# Patient Record
Sex: FEMALE | Race: WHITE | NOT HISPANIC OR LATINO | Employment: OTHER | URBAN - METROPOLITAN AREA
[De-identification: names, ages, dates, MRNs, and addresses within clinical notes are randomized per-mention and may not be internally consistent; named-entity substitution may affect disease eponyms.]

---

## 2019-01-13 ENCOUNTER — APPOINTMENT (OUTPATIENT)
Dept: RADIOLOGY | Facility: MEDICAL CENTER | Age: 64
DRG: 417 | End: 2019-01-13
Attending: HOSPITALIST
Payer: COMMERCIAL

## 2019-01-13 ENCOUNTER — APPOINTMENT (OUTPATIENT)
Dept: RADIOLOGY | Facility: MEDICAL CENTER | Age: 64
DRG: 417 | End: 2019-01-13
Attending: EMERGENCY MEDICINE
Payer: COMMERCIAL

## 2019-01-13 ENCOUNTER — HOSPITAL ENCOUNTER (INPATIENT)
Facility: MEDICAL CENTER | Age: 64
LOS: 4 days | DRG: 417 | End: 2019-01-20
Attending: EMERGENCY MEDICINE | Admitting: HOSPITALIST
Payer: COMMERCIAL

## 2019-01-13 PROBLEM — R07.9 CHEST PAIN: Status: ACTIVE | Noted: 2019-01-13

## 2019-01-13 PROBLEM — F11.20 CHRONIC NARCOTIC DEPENDENCE (HCC): Status: ACTIVE | Noted: 2019-01-13

## 2019-01-13 PROBLEM — K83.8 DILATION OF COMMON BILE DUCT: Status: ACTIVE | Noted: 2019-01-13

## 2019-01-13 LAB
ALBUMIN SERPL BCP-MCNC: 4.2 G/DL (ref 3.2–4.9)
ALBUMIN/GLOB SERPL: 1.4 G/DL
ALP SERPL-CCNC: 73 U/L (ref 30–99)
ALT SERPL-CCNC: 23 U/L (ref 2–50)
ANION GAP SERPL CALC-SCNC: 11 MMOL/L (ref 0–11.9)
APTT PPP: 22.3 SEC (ref 24.7–36)
AST SERPL-CCNC: 23 U/L (ref 12–45)
BASOPHILS # BLD AUTO: 0.5 % (ref 0–1.8)
BASOPHILS # BLD: 0.03 K/UL (ref 0–0.12)
BILIRUB SERPL-MCNC: 0.9 MG/DL (ref 0.1–1.5)
BNP SERPL-MCNC: 15 PG/ML (ref 0–100)
BUN SERPL-MCNC: 20 MG/DL (ref 8–22)
CALCIUM SERPL-MCNC: 9.1 MG/DL (ref 8.4–10.2)
CHLORIDE SERPL-SCNC: 105 MMOL/L (ref 96–112)
CO2 SERPL-SCNC: 20 MMOL/L (ref 20–33)
CREAT SERPL-MCNC: 0.7 MG/DL (ref 0.5–1.4)
EKG IMPRESSION: NORMAL
EOSINOPHIL # BLD AUTO: 0.03 K/UL (ref 0–0.51)
EOSINOPHIL NFR BLD: 0.5 % (ref 0–6.9)
ERYTHROCYTE [DISTWIDTH] IN BLOOD BY AUTOMATED COUNT: 39.7 FL (ref 35.9–50)
GLOBULIN SER CALC-MCNC: 2.9 G/DL (ref 1.9–3.5)
GLUCOSE SERPL-MCNC: 143 MG/DL (ref 65–99)
HCT VFR BLD AUTO: 40.3 % (ref 37–47)
HGB BLD-MCNC: 14.1 G/DL (ref 12–16)
IMM GRANULOCYTES # BLD AUTO: 0.03 K/UL (ref 0–0.11)
IMM GRANULOCYTES NFR BLD AUTO: 0.5 % (ref 0–0.9)
INR PPP: 0.95 (ref 0.87–1.13)
LIPASE SERPL-CCNC: 30 U/L (ref 7–58)
LYMPHOCYTES # BLD AUTO: 1.01 K/UL (ref 1–4.8)
LYMPHOCYTES NFR BLD: 16.1 % (ref 22–41)
MCH RBC QN AUTO: 30.7 PG (ref 27–33)
MCHC RBC AUTO-ENTMCNC: 35 G/DL (ref 33.6–35)
MCV RBC AUTO: 87.8 FL (ref 81.4–97.8)
MONOCYTES # BLD AUTO: 0.38 K/UL (ref 0–0.85)
MONOCYTES NFR BLD AUTO: 6.1 % (ref 0–13.4)
NEUTROPHILS # BLD AUTO: 4.79 K/UL (ref 2–7.15)
NEUTROPHILS NFR BLD: 76.3 % (ref 44–72)
NRBC # BLD AUTO: 0 K/UL
NRBC BLD-RTO: 0 /100 WBC
PLATELET # BLD AUTO: 227 K/UL (ref 164–446)
PMV BLD AUTO: 9.7 FL (ref 9–12.9)
POTASSIUM SERPL-SCNC: 4 MMOL/L (ref 3.6–5.5)
PROT SERPL-MCNC: 7.1 G/DL (ref 6–8.2)
PROTHROMBIN TIME: 12.6 SEC (ref 12–14.6)
RBC # BLD AUTO: 4.59 M/UL (ref 4.2–5.4)
SODIUM SERPL-SCNC: 136 MMOL/L (ref 135–145)
TROPONIN I SERPL-MCNC: <0.02 NG/ML (ref 0–0.04)
WBC # BLD AUTO: 6.3 K/UL (ref 4.8–10.8)

## 2019-01-13 PROCEDURE — 85025 COMPLETE CBC W/AUTO DIFF WBC: CPT

## 2019-01-13 PROCEDURE — 96376 TX/PRO/DX INJ SAME DRUG ADON: CPT

## 2019-01-13 PROCEDURE — 700117 HCHG RX CONTRAST REV CODE 255: Performed by: EMERGENCY MEDICINE

## 2019-01-13 PROCEDURE — 700111 HCHG RX REV CODE 636 W/ 250 OVERRIDE (IP): Performed by: EMERGENCY MEDICINE

## 2019-01-13 PROCEDURE — 80053 COMPREHEN METABOLIC PANEL: CPT

## 2019-01-13 PROCEDURE — 85730 THROMBOPLASTIN TIME PARTIAL: CPT

## 2019-01-13 PROCEDURE — A9270 NON-COVERED ITEM OR SERVICE: HCPCS | Performed by: HOSPITALIST

## 2019-01-13 PROCEDURE — 96375 TX/PRO/DX INJ NEW DRUG ADDON: CPT

## 2019-01-13 PROCEDURE — 99285 EMERGENCY DEPT VISIT HI MDM: CPT

## 2019-01-13 PROCEDURE — 83690 ASSAY OF LIPASE: CPT

## 2019-01-13 PROCEDURE — 93005 ELECTROCARDIOGRAM TRACING: CPT

## 2019-01-13 PROCEDURE — 700102 HCHG RX REV CODE 250 W/ 637 OVERRIDE(OP): Performed by: EMERGENCY MEDICINE

## 2019-01-13 PROCEDURE — 83880 ASSAY OF NATRIURETIC PEPTIDE: CPT

## 2019-01-13 PROCEDURE — 71275 CT ANGIOGRAPHY CHEST: CPT

## 2019-01-13 PROCEDURE — G0378 HOSPITAL OBSERVATION PER HR: HCPCS

## 2019-01-13 PROCEDURE — 700105 HCHG RX REV CODE 258: Performed by: HOSPITALIST

## 2019-01-13 PROCEDURE — 700111 HCHG RX REV CODE 636 W/ 250 OVERRIDE (IP)

## 2019-01-13 PROCEDURE — 700101 HCHG RX REV CODE 250

## 2019-01-13 PROCEDURE — 71045 X-RAY EXAM CHEST 1 VIEW: CPT

## 2019-01-13 PROCEDURE — 700102 HCHG RX REV CODE 250 W/ 637 OVERRIDE(OP): Performed by: HOSPITALIST

## 2019-01-13 PROCEDURE — 96374 THER/PROPH/DIAG INJ IV PUSH: CPT

## 2019-01-13 PROCEDURE — 85610 PROTHROMBIN TIME: CPT

## 2019-01-13 PROCEDURE — A9270 NON-COVERED ITEM OR SERVICE: HCPCS | Performed by: EMERGENCY MEDICINE

## 2019-01-13 PROCEDURE — 74181 MRI ABDOMEN W/O CONTRAST: CPT

## 2019-01-13 PROCEDURE — 84484 ASSAY OF TROPONIN QUANT: CPT

## 2019-01-13 PROCEDURE — 76705 ECHO EXAM OF ABDOMEN: CPT

## 2019-01-13 PROCEDURE — 700111 HCHG RX REV CODE 636 W/ 250 OVERRIDE (IP): Performed by: HOSPITALIST

## 2019-01-13 PROCEDURE — C9113 INJ PANTOPRAZOLE SODIUM, VIA: HCPCS | Performed by: HOSPITALIST

## 2019-01-13 PROCEDURE — 36415 COLL VENOUS BLD VENIPUNCTURE: CPT

## 2019-01-13 PROCEDURE — 99219 PR INITIAL OBSERVATION CARE,LEVL II: CPT | Performed by: HOSPITALIST

## 2019-01-13 RX ORDER — OXYCODONE AND ACETAMINOPHEN 10; 325 MG/1; MG/1
.5-1 TABLET ORAL EVERY 6 HOURS PRN
COMMUNITY

## 2019-01-13 RX ORDER — BUSPIRONE HYDROCHLORIDE 7.5 MG/1
7.5 TABLET ORAL 2 TIMES DAILY
COMMUNITY

## 2019-01-13 RX ORDER — ONDANSETRON 2 MG/ML
4 INJECTION INTRAMUSCULAR; INTRAVENOUS EVERY 4 HOURS PRN
Status: DISCONTINUED | OUTPATIENT
Start: 2019-01-13 | End: 2019-01-15

## 2019-01-13 RX ORDER — CYCLOBENZAPRINE HCL 10 MG
10 TABLET ORAL 3 TIMES DAILY PRN
Status: DISCONTINUED | OUTPATIENT
Start: 2019-01-13 | End: 2019-01-20 | Stop reason: HOSPADM

## 2019-01-13 RX ORDER — HYDROMORPHONE HYDROCHLORIDE 1 MG/ML
0.25 INJECTION, SOLUTION INTRAMUSCULAR; INTRAVENOUS; SUBCUTANEOUS
Status: DISCONTINUED | OUTPATIENT
Start: 2019-01-13 | End: 2019-01-13

## 2019-01-13 RX ORDER — OXYCODONE HYDROCHLORIDE 5 MG/1
5 TABLET ORAL
Status: DISCONTINUED | OUTPATIENT
Start: 2019-01-13 | End: 2019-01-14

## 2019-01-13 RX ORDER — MORPHINE SULFATE 4 MG/ML
4 INJECTION, SOLUTION INTRAMUSCULAR; INTRAVENOUS
Status: COMPLETED | OUTPATIENT
Start: 2019-01-13 | End: 2019-01-13

## 2019-01-13 RX ORDER — POLYETHYLENE GLYCOL 3350 17 G/17G
1 POWDER, FOR SOLUTION ORAL
Status: DISCONTINUED | OUTPATIENT
Start: 2019-01-13 | End: 2019-01-20 | Stop reason: HOSPADM

## 2019-01-13 RX ORDER — DULOXETIN HYDROCHLORIDE 30 MG/1
60 CAPSULE, DELAYED RELEASE ORAL 2 TIMES DAILY
Status: DISCONTINUED | OUTPATIENT
Start: 2019-01-13 | End: 2019-01-20 | Stop reason: HOSPADM

## 2019-01-13 RX ORDER — ASPIRIN 325 MG
325 TABLET ORAL DAILY
Status: DISCONTINUED | OUTPATIENT
Start: 2019-01-13 | End: 2019-01-15

## 2019-01-13 RX ORDER — PANTOPRAZOLE SODIUM 40 MG/10ML
40 INJECTION, POWDER, LYOPHILIZED, FOR SOLUTION INTRAVENOUS ONCE
Status: COMPLETED | OUTPATIENT
Start: 2019-01-13 | End: 2019-01-13

## 2019-01-13 RX ORDER — AMOXICILLIN 250 MG
2 CAPSULE ORAL 2 TIMES DAILY
Status: DISCONTINUED | OUTPATIENT
Start: 2019-01-13 | End: 2019-01-20 | Stop reason: HOSPADM

## 2019-01-13 RX ORDER — SODIUM CHLORIDE 9 MG/ML
INJECTION, SOLUTION INTRAVENOUS CONTINUOUS
Status: DISCONTINUED | OUTPATIENT
Start: 2019-01-13 | End: 2019-01-15

## 2019-01-13 RX ORDER — LORAZEPAM 2 MG/ML
0.5 INJECTION INTRAMUSCULAR EVERY 4 HOURS PRN
Status: DISCONTINUED | OUTPATIENT
Start: 2019-01-13 | End: 2019-01-15

## 2019-01-13 RX ORDER — ONDANSETRON 2 MG/ML
4 INJECTION INTRAMUSCULAR; INTRAVENOUS ONCE
Status: COMPLETED | OUTPATIENT
Start: 2019-01-13 | End: 2019-01-13

## 2019-01-13 RX ORDER — IBUPROFEN 200 MG
400 TABLET ORAL EVERY 6 HOURS PRN
COMMUNITY

## 2019-01-13 RX ORDER — ASPIRIN 81 MG/1
324 TABLET, CHEWABLE ORAL DAILY
Status: DISCONTINUED | OUTPATIENT
Start: 2019-01-13 | End: 2019-01-15

## 2019-01-13 RX ORDER — OXYCODONE HYDROCHLORIDE 5 MG/1
5 TABLET ORAL
Status: DISCONTINUED | OUTPATIENT
Start: 2019-01-13 | End: 2019-01-13

## 2019-01-13 RX ORDER — OXYCODONE AND ACETAMINOPHEN 10; 325 MG/1; MG/1
.5-1 TABLET ORAL EVERY 6 HOURS PRN
Status: DISCONTINUED | OUTPATIENT
Start: 2019-01-13 | End: 2019-01-14

## 2019-01-13 RX ORDER — HYDROMORPHONE HYDROCHLORIDE 1 MG/ML
1 INJECTION, SOLUTION INTRAMUSCULAR; INTRAVENOUS; SUBCUTANEOUS
Status: DISCONTINUED | OUTPATIENT
Start: 2019-01-13 | End: 2019-01-15

## 2019-01-13 RX ORDER — PROMETHAZINE HYDROCHLORIDE 25 MG/1
12.5-25 TABLET ORAL EVERY 4 HOURS PRN
Status: DISCONTINUED | OUTPATIENT
Start: 2019-01-13 | End: 2019-01-20 | Stop reason: HOSPADM

## 2019-01-13 RX ORDER — ONDANSETRON 4 MG/1
4 TABLET, ORALLY DISINTEGRATING ORAL EVERY 4 HOURS PRN
Status: DISCONTINUED | OUTPATIENT
Start: 2019-01-13 | End: 2019-01-20 | Stop reason: HOSPADM

## 2019-01-13 RX ORDER — HYDROMORPHONE HYDROCHLORIDE 1 MG/ML
1 INJECTION, SOLUTION INTRAMUSCULAR; INTRAVENOUS; SUBCUTANEOUS ONCE
Status: COMPLETED | OUTPATIENT
Start: 2019-01-13 | End: 2019-01-13

## 2019-01-13 RX ORDER — LORAZEPAM 2 MG/ML
INJECTION INTRAMUSCULAR
Status: COMPLETED
Start: 2019-01-13 | End: 2019-01-13

## 2019-01-13 RX ORDER — CYCLOBENZAPRINE HCL 10 MG
10 TABLET ORAL 3 TIMES DAILY PRN
COMMUNITY

## 2019-01-13 RX ORDER — BUSPIRONE HYDROCHLORIDE 5 MG/1
7.5 TABLET ORAL 2 TIMES DAILY
Status: DISCONTINUED | OUTPATIENT
Start: 2019-01-13 | End: 2019-01-20 | Stop reason: HOSPADM

## 2019-01-13 RX ORDER — BISACODYL 10 MG
10 SUPPOSITORY, RECTAL RECTAL
Status: DISCONTINUED | OUTPATIENT
Start: 2019-01-13 | End: 2019-01-20 | Stop reason: HOSPADM

## 2019-01-13 RX ORDER — PROMETHAZINE HYDROCHLORIDE 25 MG/1
12.5-25 SUPPOSITORY RECTAL EVERY 4 HOURS PRN
Status: DISCONTINUED | OUTPATIENT
Start: 2019-01-13 | End: 2019-01-20 | Stop reason: HOSPADM

## 2019-01-13 RX ORDER — ASPIRIN 600 MG/1
300 SUPPOSITORY RECTAL DAILY
Status: DISCONTINUED | OUTPATIENT
Start: 2019-01-13 | End: 2019-01-15

## 2019-01-13 RX ORDER — DULOXETIN HYDROCHLORIDE 60 MG/1
60 CAPSULE, DELAYED RELEASE ORAL 2 TIMES DAILY
COMMUNITY

## 2019-01-13 RX ORDER — OXYCODONE HYDROCHLORIDE 5 MG/1
2.5 TABLET ORAL
Status: DISCONTINUED | OUTPATIENT
Start: 2019-01-13 | End: 2019-01-14

## 2019-01-13 RX ORDER — OXYCODONE HYDROCHLORIDE 5 MG/1
2.5 TABLET ORAL
Status: DISCONTINUED | OUTPATIENT
Start: 2019-01-13 | End: 2019-01-13

## 2019-01-13 RX ADMIN — IOHEXOL 100 ML: 350 INJECTION, SOLUTION INTRAVENOUS at 10:43

## 2019-01-13 RX ADMIN — HYDROMORPHONE HYDROCHLORIDE 1 MG: 1 INJECTION, SOLUTION INTRAMUSCULAR; INTRAVENOUS; SUBCUTANEOUS at 20:56

## 2019-01-13 RX ADMIN — SODIUM CHLORIDE: 9 INJECTION, SOLUTION INTRAVENOUS at 12:15

## 2019-01-13 RX ADMIN — ONDANSETRON 4 MG: 2 INJECTION INTRAMUSCULAR; INTRAVENOUS at 10:02

## 2019-01-13 RX ADMIN — MORPHINE SULFATE 4 MG: 4 INJECTION INTRAVENOUS at 14:36

## 2019-01-13 RX ADMIN — MORPHINE SULFATE 4 MG: 4 INJECTION INTRAVENOUS at 16:19

## 2019-01-13 RX ADMIN — HYDROMORPHONE HYDROCHLORIDE 1 MG: 1 INJECTION, SOLUTION INTRAMUSCULAR; INTRAVENOUS; SUBCUTANEOUS at 17:50

## 2019-01-13 RX ADMIN — LIDOCAINE HYDROCHLORIDE 30 ML: 20 SOLUTION OROPHARYNGEAL at 10:02

## 2019-01-13 RX ADMIN — CYCLOBENZAPRINE HYDROCHLORIDE 10 MG: 10 TABLET, FILM COATED ORAL at 20:09

## 2019-01-13 RX ADMIN — LORAZEPAM 0.5 MG: 2 INJECTION INTRAMUSCULAR; INTRAVENOUS at 17:53

## 2019-01-13 RX ADMIN — PANTOPRAZOLE SODIUM 40 MG: 40 INJECTION, POWDER, FOR SOLUTION INTRAVENOUS at 20:10

## 2019-01-13 RX ADMIN — HYDROMORPHONE HYDROCHLORIDE 1 MG: 1 INJECTION, SOLUTION INTRAMUSCULAR; INTRAVENOUS; SUBCUTANEOUS at 11:35

## 2019-01-13 RX ADMIN — ONDANSETRON 4 MG: 2 INJECTION INTRAMUSCULAR; INTRAVENOUS at 20:10

## 2019-01-13 RX ADMIN — HYDROMORPHONE HYDROCHLORIDE 1 MG: 1 INJECTION, SOLUTION INTRAMUSCULAR; INTRAVENOUS; SUBCUTANEOUS at 13:32

## 2019-01-13 RX ADMIN — HYDROMORPHONE HYDROCHLORIDE 0.25 MG: 1 INJECTION, SOLUTION INTRAMUSCULAR; INTRAVENOUS; SUBCUTANEOUS at 17:16

## 2019-01-13 RX ADMIN — ASPIRIN 325 MG ORAL TABLET 325 MG: 325 PILL ORAL at 14:29

## 2019-01-13 RX ADMIN — MORPHINE SULFATE 4 MG: 4 INJECTION INTRAVENOUS at 10:16

## 2019-01-13 RX ADMIN — OXYCODONE HYDROCHLORIDE AND ACETAMINOPHEN 1 TABLET: 10; 325 TABLET ORAL at 20:10

## 2019-01-13 ASSESSMENT — ENCOUNTER SYMPTOMS
LOSS OF CONSCIOUSNESS: 0
MYALGIAS: 0
FLANK PAIN: 0
NERVOUS/ANXIOUS: 1
VOMITING: 1
NAUSEA: 1
DEPRESSION: 0
BACK PAIN: 1
COUGH: 0
CONSTITUTIONAL NEGATIVE: 1
CHILLS: 0
ABDOMINAL PAIN: 1
NEUROLOGICAL NEGATIVE: 1
WEIGHT LOSS: 0
DIZZINESS: 0
WEAKNESS: 0
BRUISES/BLEEDS EASILY: 0
SHORTNESS OF BREATH: 0
FEVER: 0
RESPIRATORY NEGATIVE: 1

## 2019-01-13 ASSESSMENT — COGNITIVE AND FUNCTIONAL STATUS - GENERAL
SUGGESTED CMS G CODE MODIFIER DAILY ACTIVITY: CH
SUGGESTED CMS G CODE MODIFIER MOBILITY: CH
DAILY ACTIVITIY SCORE: 24
MOBILITY SCORE: 24

## 2019-01-13 ASSESSMENT — PAIN SCALES - GENERAL
PAINLEVEL_OUTOF10: 9
PAINLEVEL_OUTOF10: 8
PAINLEVEL_OUTOF10: 7

## 2019-01-13 ASSESSMENT — PAIN DESCRIPTION - DESCRIPTORS: DESCRIPTORS: SHARP

## 2019-01-13 NOTE — ED TRIAGE NOTES
Chief Complaint   Patient presents with   • Chest Pain     pain started at 0400, radiating to back. Patient report 9/10 pain, denies cardiac history, EKG completed.    • Back Pain

## 2019-01-13 NOTE — ED NOTES
1145 Admitting orders received. Waiting for bed assignment.  1205 room assignment received . Pt to go to [facility]2. Room dirty at this time

## 2019-01-13 NOTE — ED PROVIDER NOTES
"ED Provider Note    CHIEF COMPLAINT  Chief Complaint   Patient presents with   • Chest Pain     pain started at 0400, radiating to back. Patient report 9/10 pain, denies cardiac history, EKG completed.    • Back Pain       HPI  Alba Tsang is a 63 y.o. female who presents with chest pain.  Central sternal radiates to the back.  No modifying factors.  Not positional.  Not pleuritic.  Associated epigastric abdominal pain.  Sharp character.  Started at 4 AM and has been constant.  Tried Gas-X which made her nauseous and she vomited afterwards.  She had normal bowel movements.  She has not had fever.  No leg swelling, immobilization or surgery.     She had a similar episode to this a month ago.  She has a history of knee pain and hip pain for which she has taken long-term opiates.  She is currently trying atyp or her opiates.  Towards the end of the month when her supply of pain medication got low she had symptoms last month as well.  This lasted for about a day and then went away.    REVIEW OF SYSTEMS  As per HPI, otherwise a 10 point review of systems is negative    PAST MEDICAL HISTORY  Opiate dependence    SOCIAL HISTORY  Social History   Substance Use Topics   • Smoking status: Never Smoker   • Smokeless tobacco: Not on file   • Alcohol use Yes      Comment: \"wine with dinner\"       SURGICAL HISTORY  History reviewed. No pertinent surgical history.    CURRENT MEDICATIONS  Home Medications    **Home medications have not yet been reviewed for this encounter**         ALLERGIES  No Known Allergies    PHYSICAL EXAM  VITAL SIGNS: BP (!) 169/85   Pulse 73   Resp 18   Ht 1.676 m (5' 6\")   Wt 70 kg (154 lb 5.2 oz)   SpO2 97%   BMI 24.91 kg/m²    Constitutional: Awake and alert  HENT:  Atraumatic, Normocephalic.Oropharynx moist mucus membranes, Nose normal inspection.   Eyes: Normal inspection  Neck: Supple  Cardiovascular: Normal heart rate, Normal rhythm.  No murmur auscultated symmetric peripheral pulses. "   Thorax & Lungs: No respiratory distress, No wheezing, No rales, No rhonchi, No chest tenderness.   Abdomen: Tenderness to palpation in epigastrium and right upper quadrant.  No rebound or peritonitis.  Skin: Warm, Dry, No rash.   Extremities: No clubbing, cyanosis, edema, no Homans or cords   Neurologic: Grossly normal   Psychiatric: Anxious appearing    RADIOLOGY/PROCEDURES  CT-CTA COMPLETE THORACOABDOMINAL AORTA   Final Result      1.  No aortic aneurysm or dissection. No pulmonary embolus.   2.  Colonic diverticulosis.   3.  Mild circumferential mural thickening of the transverse and descending colon, likely related to underdistention or sequela of prior colitis. Acute colitis is less likely.         US-RUQ   Final Result         1. Cholelithiasis. No evidence of cholecystitis.   2. Dilated, 10 mm CBD. If there is clinical concern for choledocholithiasis, consider MRCP.   3. Increased hepatic echogenicity, suggestive of hepatic steatosis or hepatocellular disease.      DX-CHEST-PORTABLE (1 VIEW)   Final Result      No acute cardiopulmonary abnormality.           Imaging is interpreted by radiologist    Labs:  Results for orders placed or performed during the hospital encounter of 01/13/19   CBC with Differential   Result Value Ref Range    WBC 6.3 4.8 - 10.8 K/uL    RBC 4.59 4.20 - 5.40 M/uL    Hemoglobin 14.1 12.0 - 16.0 g/dL    Hematocrit 40.3 37.0 - 47.0 %    MCV 87.8 81.4 - 97.8 fL    MCH 30.7 27.0 - 33.0 pg    MCHC 35.0 33.6 - 35.0 g/dL    RDW 39.7 35.9 - 50.0 fL    Platelet Count 227 164 - 446 K/uL    MPV 9.7 9.0 - 12.9 fL    Neutrophils-Polys 76.30 (H) 44.00 - 72.00 %    Lymphocytes 16.10 (L) 22.00 - 41.00 %    Monocytes 6.10 0.00 - 13.40 %    Eosinophils 0.50 0.00 - 6.90 %    Basophils 0.50 0.00 - 1.80 %    Immature Granulocytes 0.50 0.00 - 0.90 %    Nucleated RBC 0.00 /100 WBC    Neutrophils (Absolute) 4.79 2.00 - 7.15 K/uL    Lymphs (Absolute) 1.01 1.00 - 4.80 K/uL    Monos (Absolute) 0.38 0.00 - 0.85  K/uL    Eos (Absolute) 0.03 0.00 - 0.51 K/uL    Baso (Absolute) 0.03 0.00 - 0.12 K/uL    Immature Granulocytes (abs) 0.03 0.00 - 0.11 K/uL    NRBC (Absolute) 0.00 K/uL   Complete Metabolic Panel (CMP)   Result Value Ref Range    Sodium 136 135 - 145 mmol/L    Potassium 4.0 3.6 - 5.5 mmol/L    Chloride 105 96 - 112 mmol/L    Co2 20 20 - 33 mmol/L    Anion Gap 11.0 0.0 - 11.9    Glucose 143 (H) 65 - 99 mg/dL    Bun 20 8 - 22 mg/dL    Creatinine 0.70 0.50 - 1.40 mg/dL    Calcium 9.1 8.4 - 10.2 mg/dL    AST(SGOT) 23 12 - 45 U/L    ALT(SGPT) 23 2 - 50 U/L    Alkaline Phosphatase 73 30 - 99 U/L    Total Bilirubin 0.9 0.1 - 1.5 mg/dL    Albumin 4.2 3.2 - 4.9 g/dL    Total Protein 7.1 6.0 - 8.2 g/dL    Globulin 2.9 1.9 - 3.5 g/dL    A-G Ratio 1.4 g/dL   Btype Natriuretic Peptide (BNP)   Result Value Ref Range    B Natriuretic Peptide 15 0 - 100 pg/mL   Prothrombin Time (PT/INR)   Result Value Ref Range    PT 12.6 12.0 - 14.6 sec    INR 0.95 0.87 - 1.13   APTT   Result Value Ref Range    APTT 22.3 (L) 24.7 - 36.0 sec   Lipase   Result Value Ref Range    Lipase 30 7 - 58 U/L   Troponin STAT   Result Value Ref Range    Troponin I <0.02 0.00 - 0.04 ng/mL   ESTIMATED GFR   Result Value Ref Range    GFR If African American >60 >60 mL/min/1.73 m 2    GFR If Non African American >60 >60 mL/min/1.73 m 2   EKG   Result Value Ref Range    Report       [facility]    Test Date:  2019  Pt Name:    GOMEZ LOPEZ             Department: [facility]  MRN:        [id number]                      Room:       -ROOM 8  Gender:     Female                       Technician: SRINIVAS  :        [id number]                   Requested By:ER TRIAGE PROTOCOL  Order #:    [id number]                    Reading MD: CORETTA WALLACE MD    Measurements  Intervals                                Axis  Rate:       70                           P:          56  NM:         151                          QRS:        61  QRSD:        95                           T:          71  QT:         417  QTc:        450    Interpretive Statements  Sinus rhythm  No previous ECG available for comparison    Electronically Signed On 1- 11:18:55 PST by CROETTA WALLACE MD         Medications   morphine (pf) 4 mg/ml injection 4 mg (4 mg Intravenous Given 1/13/19 1016)   HYDROmorphone pf (DILAUDID) injection 1 mg (not administered)   hyoscyamine-maalox plus-lidocaine viscous (GI COCKTAIL) oral susp 30 mL (30 mL Oral Given 1/13/19 1002)   ondansetron (ZOFRAN) syringe/vial injection 4 mg (4 mg Intravenous Given 1/13/19 1002)   iohexol (OMNIPAQUE) 350 mg/mL (100 mL Intravenous Given 1/13/19 1043)       COURSE & MEDICAL DECISION MAKING  Patient presents with chest pain radiating to her back.  Had associated abdominal tenderness on her examination.  Differential was broad.  Initial EKGs without acute ischemia.  Trial of GI cocktail did not improve symptoms.  He was given morphine with transient slight improvement of symptoms, but had recurrence of severe pain and was subsequently given Dilaudid IV.  Laboratory data returned reassuring.  CT scan of the aorta was unremarkable.  Ultrasound of the right upper quadrant does show small gallstones, but no evidence of cholecystitis or indication for immediate surgical consultation.  She was noted to have a dilated common bile duct despite unremarkable laboratory data.  Because of ongoing significant symptoms this will need further evaluation.  Also will obtain serial troponins.  I paged hospitalist for admission.      FINAL IMPRESSION  1.  Chest pain  2.  Right upper quadrant abdominal pain with dilated common bile duct, rule out biliary obstruction      This dictation was created using voice recognition software. The accuracy of the dictation is limited to the abilities of the software.  The nursing notes were reviewed and certain aspects of this information were incorporated into this note.      Electronically  signed by: Jose Díaz, 1/13/2019 10:09 AM

## 2019-01-13 NOTE — H&P
Hospital Medicine History & Physical Note    Date of Service  1/13/2019    Primary Care Physician  Micheline Walker D.O.    Consultants  None at this time.    Code Status  Full code.    Chief Complaint  Right upper quadrant and chest pain.    History of Presenting Illness  63 y.o. female who presented 1/13/2019 with epigastric, RUQ and lower chest pain radiating to between her shoulder blades. She had a similar episode to this one about a month ago that awakened her from sleep and persisted for about 24 hours. Then today a similar pain awakened her from sleep, this time accompanied by an episode of nausea and vomiting after she took some Gas X to try to relieve the discomfort. No diarrhea, fevers, chills or jaundice.    Recently she has had knee and hip surgery and is in the process of weaning off of narcotic pain medications.        Review of Systems  Review of Systems   Constitutional: Negative.  Negative for chills, fever, malaise/fatigue and weight loss.   HENT: Negative.    Respiratory: Negative.  Negative for cough and shortness of breath.    Cardiovascular: Positive for chest pain. Negative for leg swelling.   Gastrointestinal: Positive for abdominal pain, nausea and vomiting.   Genitourinary: Negative.  Negative for dysuria and flank pain.   Musculoskeletal: Positive for back pain and joint pain. Negative for myalgias.   Neurological: Negative.  Negative for dizziness, loss of consciousness and weakness.   Endo/Heme/Allergies: Negative.  Does not bruise/bleed easily.   Psychiatric/Behavioral: Negative for depression. The patient is nervous/anxious.    All other systems reviewed and are negative.      Past Medical History   has a past medical history of Chronic pain.    Surgical History   has a past surgical history that includes hip arthroplasty total (Right); acl repair (Right); and hysterectomy, total abdominal.     Family History  family history includes No Known Problems in her mother.     Social  History   reports that she has never smoked. She does not have any smokeless tobacco history on file. She reports that she drinks alcohol.she has a glass of wine with dinner several times per week. No history of substance abuse.    Allergies  No Known Allergies    Medications  Prior to Admission Medications   Prescriptions Last Dose Informant Patient Reported? Taking?   DULoxetine (CYMBALTA) 60 MG Cap DR Particles delayed-release capsule 1/12/2019 at 1930 Rx Bottle (For Med Information) Yes Yes   Sig: Take 60 mg by mouth 2 times a day.   busPIRone (BUSPAR) 7.5 MG tablet 1/12/2019 at 1930 Rx Bottle (For Med Information) Yes Yes   Sig: Take 7.5 mg by mouth 2 times a day.   cyclobenzaprine (FLEXERIL) 10 MG Tab 1/12/2019 at 1930 Rx Bottle (For Med Information) Yes Yes   Sig: Take 10 mg by mouth 3 times a day as needed for Muscle Spasms.   ibuprofen (MOTRIN) 200 MG Tab 1/12/2019 at 2000 Patient Yes Yes   Sig: Take 400 mg by mouth every 6 hours as needed for Mild Pain.   oxyCODONE-acetaminophen (PERCOCET-10)  MG Tab 1/13/2019 at 0630 Rx Bottle (For Med Information) Yes Yes   Sig: Take 0.5-1 Tabs by mouth every 6 hours as needed for Severe Pain.      Facility-Administered Medications: None       Physical Exam  Pulse:  [61-73] 62  Resp:  [18] 18  BP: (169)/(85) 169/85    Physical Exam   Constitutional: She appears well-developed and well-nourished. No distress.   Plan of care discussed with bedside RN.   HENT:   Nose: Nose normal.   Mouth/Throat: Oropharynx is clear and moist. No oropharyngeal exudate.   Eyes: Conjunctivae are normal. Right eye exhibits no discharge. Left eye exhibits no discharge. No scleral icterus.   Neck: No JVD present. No tracheal deviation present.   Cardiovascular: Normal rate, regular rhythm and normal heart sounds.    Pulmonary/Chest: Effort normal and breath sounds normal. No stridor. No respiratory distress. She has no wheezes. She has no rales. She exhibits no tenderness.   Abdominal:  Soft. Bowel sounds are normal. She exhibits no distension. There is tenderness in the right upper quadrant.   Musculoskeletal: She exhibits no edema or tenderness.   Neurological: She is alert. No cranial nerve deficit. She exhibits normal muscle tone.   Skin: Skin is warm and dry. She is not diaphoretic. No pallor.   Psychiatric: She has a normal mood and affect. Her behavior is normal.   Nursing note and vitals reviewed.      Laboratory:  Recent Labs      01/13/19 0923   WBC  6.3   RBC  4.59   HEMOGLOBIN  14.1   HEMATOCRIT  40.3   MCV  87.8   MCH  30.7   MCHC  35.0   RDW  39.7   PLATELETCT  227   MPV  9.7     Recent Labs      01/13/19 0923   SODIUM  136   POTASSIUM  4.0   CHLORIDE  105   CO2  20   GLUCOSE  143*   BUN  20   CREATININE  0.70   CALCIUM  9.1     Recent Labs      01/13/19 0923   ALTSGPT  23   ASTSGOT  23   ALKPHOSPHAT  73   TBILIRUBIN  0.9   LIPASE  30   GLUCOSE  143*     Recent Labs      01/13/19 0923   APTT  22.3*   INR  0.95     Recent Labs      01/13/19 0923   BNPBTYPENAT  15         Recent Labs      01/13/19 0923   TROPONINI  <0.02       Urinalysis:    No results found     Imaging:  CT-CTA COMPLETE THORACOABDOMINAL AORTA   Final Result      1.  No aortic aneurysm or dissection. No pulmonary embolus.   2.  Colonic diverticulosis.   3.  Mild circumferential mural thickening of the transverse and descending colon, likely related to underdistention or sequela of prior colitis. Acute colitis is less likely.         US-RUQ   Final Result         1. Cholelithiasis. No evidence of cholecystitis.   2. Dilated, 10 mm CBD. If there is clinical concern for choledocholithiasis, consider MRCP.   3. Increased hepatic echogenicity, suggestive of hepatic steatosis or hepatocellular disease.      DX-CHEST-PORTABLE (1 VIEW)   Final Result      No acute cardiopulmonary abnormality.      TW-[id number]-L/O    (Results Pending)         Assessment/Plan:  I anticipate this patient is appropriate for observation  status at this time.    Chronic narcotic dependence (HCC)   Assessment & Plan    Patient is on percocet at home chronically, continue this here q 4 hours prn in addition to oxycodone and dilaudid for escalating pain.  Continue home dosing of buspar and cymbalta for chronic non narcotic pain management.     Chest pain   Assessment & Plan    May be related to biliary condition, trend troponin levels, ekg prn.  Consider stress testing if there are changes in condition but at this time no stress test while working up bile duct issue. Chest pain is low suspicion for ACS at this time.     Dilation of common bile duct   Assessment & Plan    With RUQ pain but no elevation in liver studies.  Admit for observation, obtain MRCP to further characterize the biliary tree, no abx no cholecystitis   Pain management dilaudid 0.25mg iv every 3 hours as needed for more severe pain, oral oxycodone 5 mg po q 3 hours prn milder pain.  GI or surgery consult to consider if MRCP is abnormal.  Differential diagnosis includes neoplasm of the bile duct which may be visualized on mrcp.         VTE prophylaxis: SCD

## 2019-01-13 NOTE — ED NOTES
Med rec updated and complete  Allergies reviewed  Interviewed pt with  at bedside with permission from pt.  Pt has RX bottles at bedside, went over RX bottles and returned RX bottles back to pts .  Pt reports no antibiotics in the last 30 days.  Pt reports no vitamins.

## 2019-01-13 NOTE — PROGRESS NOTES
Patient admitted to telemetry floor.  MRI screen sheet sent down to MRI.  Patient given pain medication per MAR and ASA. Patient given ice chips, bed alarm placed, call light within reach.

## 2019-01-13 NOTE — ED NOTES
Pt medicated per ERP orders. Pt prepared for transport to acute floor. All belongings sent with pt.

## 2019-01-13 NOTE — ASSESSMENT & PLAN NOTE
Status post successful ERCP  Advance to low-fat diet as tolerated-discussed with patient, spouse  Discussed with GI  A.m. labs ordered by GI

## 2019-01-14 PROBLEM — K80.50 CHOLEDOCHOLITHIASIS: Status: ACTIVE | Noted: 2019-01-13

## 2019-01-14 PROBLEM — R74.8 ELEVATED LIVER ENZYMES: Status: ACTIVE | Noted: 2019-01-14

## 2019-01-14 LAB
ALBUMIN SERPL BCP-MCNC: 3.6 G/DL (ref 3.2–4.9)
ALBUMIN/GLOB SERPL: 1.4 G/DL
ALP SERPL-CCNC: 106 U/L (ref 30–99)
ALT SERPL-CCNC: 117 U/L (ref 2–50)
ANION GAP SERPL CALC-SCNC: 7 MMOL/L (ref 0–11.9)
AST SERPL-CCNC: 273 U/L (ref 12–45)
BASOPHILS # BLD AUTO: 0.2 % (ref 0–1.8)
BASOPHILS # BLD: 0.02 K/UL (ref 0–0.12)
BILIRUB SERPL-MCNC: 2.2 MG/DL (ref 0.1–1.5)
BUN SERPL-MCNC: 15 MG/DL (ref 8–22)
CALCIUM SERPL-MCNC: 8.3 MG/DL (ref 8.4–10.2)
CHLORIDE SERPL-SCNC: 104 MMOL/L (ref 96–112)
CO2 SERPL-SCNC: 28 MMOL/L (ref 20–33)
CREAT SERPL-MCNC: 0.71 MG/DL (ref 0.5–1.4)
EOSINOPHIL # BLD AUTO: 0.01 K/UL (ref 0–0.51)
EOSINOPHIL NFR BLD: 0.1 % (ref 0–6.9)
ERYTHROCYTE [DISTWIDTH] IN BLOOD BY AUTOMATED COUNT: 43.2 FL (ref 35.9–50)
GLOBULIN SER CALC-MCNC: 2.5 G/DL (ref 1.9–3.5)
GLUCOSE SERPL-MCNC: 122 MG/DL (ref 65–99)
HCT VFR BLD AUTO: 41.6 % (ref 37–47)
HGB BLD-MCNC: 13.7 G/DL (ref 12–16)
IMM GRANULOCYTES # BLD AUTO: 0.03 K/UL (ref 0–0.11)
IMM GRANULOCYTES NFR BLD AUTO: 0.3 % (ref 0–0.9)
LYMPHOCYTES # BLD AUTO: 0.58 K/UL (ref 1–4.8)
LYMPHOCYTES NFR BLD: 5.8 % (ref 22–41)
MCH RBC QN AUTO: 30.5 PG (ref 27–33)
MCHC RBC AUTO-ENTMCNC: 32.9 G/DL (ref 33.6–35)
MCV RBC AUTO: 92.7 FL (ref 81.4–97.8)
MONOCYTES # BLD AUTO: 0.69 K/UL (ref 0–0.85)
MONOCYTES NFR BLD AUTO: 6.9 % (ref 0–13.4)
NEUTROPHILS # BLD AUTO: 8.66 K/UL (ref 2–7.15)
NEUTROPHILS NFR BLD: 86.7 % (ref 44–72)
NRBC # BLD AUTO: 0 K/UL
NRBC BLD-RTO: 0 /100 WBC
PATHOLOGY CONSULT NOTE: NORMAL
PLATELET # BLD AUTO: 206 K/UL (ref 164–446)
PMV BLD AUTO: 10.3 FL (ref 9–12.9)
POTASSIUM SERPL-SCNC: 3.6 MMOL/L (ref 3.6–5.5)
PROT SERPL-MCNC: 6.1 G/DL (ref 6–8.2)
RBC # BLD AUTO: 4.49 M/UL (ref 4.2–5.4)
SODIUM SERPL-SCNC: 139 MMOL/L (ref 135–145)
WBC # BLD AUTO: 10 K/UL (ref 4.8–10.8)

## 2019-01-14 PROCEDURE — 700111 HCHG RX REV CODE 636 W/ 250 OVERRIDE (IP): Performed by: HOSPITALIST

## 2019-01-14 PROCEDURE — A9270 NON-COVERED ITEM OR SERVICE: HCPCS | Performed by: SURGERY

## 2019-01-14 PROCEDURE — A9270 NON-COVERED ITEM OR SERVICE: HCPCS | Performed by: HOSPITALIST

## 2019-01-14 PROCEDURE — 700102 HCHG RX REV CODE 250 W/ 637 OVERRIDE(OP): Performed by: INTERNAL MEDICINE

## 2019-01-14 PROCEDURE — 501570 HCHG TROCAR, SEPARATOR: Performed by: SURGERY

## 2019-01-14 PROCEDURE — 160002 HCHG RECOVERY MINUTES (STAT): Performed by: SURGERY

## 2019-01-14 PROCEDURE — 500800 HCHG LAPAROSCOPIC J/L HOOK: Performed by: SURGERY

## 2019-01-14 PROCEDURE — 501577 HCHG TROCAR, STEP 11MM: Performed by: SURGERY

## 2019-01-14 PROCEDURE — 80053 COMPREHEN METABOLIC PANEL: CPT

## 2019-01-14 PROCEDURE — 160028 HCHG SURGERY MINUTES - 1ST 30 MINS LEVEL 3: Performed by: SURGERY

## 2019-01-14 PROCEDURE — 0FT44ZZ RESECTION OF GALLBLADDER, PERCUTANEOUS ENDOSCOPIC APPROACH: ICD-10-PCS | Performed by: SURGERY

## 2019-01-14 PROCEDURE — 99226 PR SUBSEQUENT OBSERVATION CARE,LEVEL III: CPT | Performed by: INTERNAL MEDICINE

## 2019-01-14 PROCEDURE — 88304 TISSUE EXAM BY PATHOLOGIST: CPT

## 2019-01-14 PROCEDURE — 700102 HCHG RX REV CODE 250 W/ 637 OVERRIDE(OP): Performed by: ANESTHESIOLOGY

## 2019-01-14 PROCEDURE — 160009 HCHG ANES TIME/MIN: Performed by: SURGERY

## 2019-01-14 PROCEDURE — 160036 HCHG PACU - EA ADDL 30 MINS PHASE I: Performed by: SURGERY

## 2019-01-14 PROCEDURE — A9270 NON-COVERED ITEM OR SERVICE: HCPCS

## 2019-01-14 PROCEDURE — 700101 HCHG RX REV CODE 250

## 2019-01-14 PROCEDURE — G0378 HOSPITAL OBSERVATION PER HR: HCPCS

## 2019-01-14 PROCEDURE — 700102 HCHG RX REV CODE 250 W/ 637 OVERRIDE(OP)

## 2019-01-14 PROCEDURE — 700111 HCHG RX REV CODE 636 W/ 250 OVERRIDE (IP): Performed by: ANESTHESIOLOGY

## 2019-01-14 PROCEDURE — 96375 TX/PRO/DX INJ NEW DRUG ADDON: CPT

## 2019-01-14 PROCEDURE — A6402 STERILE GAUZE <= 16 SQ IN: HCPCS | Performed by: SURGERY

## 2019-01-14 PROCEDURE — 96376 TX/PRO/DX INJ SAME DRUG ADON: CPT

## 2019-01-14 PROCEDURE — 700111 HCHG RX REV CODE 636 W/ 250 OVERRIDE (IP)

## 2019-01-14 PROCEDURE — 160048 HCHG OR STATISTICAL LEVEL 1-5: Performed by: SURGERY

## 2019-01-14 PROCEDURE — A9270 NON-COVERED ITEM OR SERVICE: HCPCS | Performed by: ANESTHESIOLOGY

## 2019-01-14 PROCEDURE — 501838 HCHG SUTURE GENERAL: Performed by: SURGERY

## 2019-01-14 PROCEDURE — 501399 HCHG SPECIMAN BAG, ENDO CATC: Performed by: SURGERY

## 2019-01-14 PROCEDURE — 700102 HCHG RX REV CODE 250 W/ 637 OVERRIDE(OP): Performed by: HOSPITALIST

## 2019-01-14 PROCEDURE — A9270 NON-COVERED ITEM OR SERVICE: HCPCS | Performed by: INTERNAL MEDICINE

## 2019-01-14 PROCEDURE — 700102 HCHG RX REV CODE 250 W/ 637 OVERRIDE(OP): Performed by: SURGERY

## 2019-01-14 PROCEDURE — 501583 HCHG TROCAR, THRD CAN&SEAL 5X100: Performed by: SURGERY

## 2019-01-14 PROCEDURE — 160035 HCHG PACU - 1ST 60 MINS PHASE I: Performed by: SURGERY

## 2019-01-14 PROCEDURE — 502571 HCHG PACK, LAP CHOLE: Performed by: SURGERY

## 2019-01-14 PROCEDURE — 500868 HCHG NEEDLE, SURGI(VARES): Performed by: SURGERY

## 2019-01-14 PROCEDURE — 700105 HCHG RX REV CODE 258: Performed by: HOSPITALIST

## 2019-01-14 PROCEDURE — 160039 HCHG SURGERY MINUTES - EA ADDL 1 MIN LEVEL 3: Performed by: SURGERY

## 2019-01-14 PROCEDURE — 501584 HCHG TROCAR, THRD CAN&SEAL11X100: Performed by: SURGERY

## 2019-01-14 PROCEDURE — 700111 HCHG RX REV CODE 636 W/ 250 OVERRIDE (IP): Performed by: SURGERY

## 2019-01-14 PROCEDURE — 85025 COMPLETE CBC W/AUTO DIFF WBC: CPT

## 2019-01-14 RX ORDER — OXYCODONE HCL 5 MG/5 ML
10 SOLUTION, ORAL ORAL
Status: COMPLETED | OUTPATIENT
Start: 2019-01-14 | End: 2019-01-14

## 2019-01-14 RX ORDER — BUPIVACAINE HYDROCHLORIDE AND EPINEPHRINE 5; 5 MG/ML; UG/ML
INJECTION, SOLUTION EPIDURAL; INTRACAUDAL; PERINEURAL
Status: DISCONTINUED | OUTPATIENT
Start: 2019-01-14 | End: 2019-01-14 | Stop reason: HOSPADM

## 2019-01-14 RX ORDER — MEPERIDINE HYDROCHLORIDE 25 MG/ML
12.5 INJECTION INTRAMUSCULAR; INTRAVENOUS; SUBCUTANEOUS
Status: DISCONTINUED | OUTPATIENT
Start: 2019-01-14 | End: 2019-01-14 | Stop reason: HOSPADM

## 2019-01-14 RX ORDER — BUSPIRONE HYDROCHLORIDE 5 MG/1
2.5 TABLET ORAL ONCE
Status: COMPLETED | OUTPATIENT
Start: 2019-01-14 | End: 2019-01-14

## 2019-01-14 RX ORDER — HYDRALAZINE HYDROCHLORIDE 20 MG/ML
5 INJECTION INTRAMUSCULAR; INTRAVENOUS
Status: DISCONTINUED | OUTPATIENT
Start: 2019-01-14 | End: 2019-01-14 | Stop reason: HOSPADM

## 2019-01-14 RX ORDER — DIPHENHYDRAMINE HYDROCHLORIDE 50 MG/ML
12.5 INJECTION INTRAMUSCULAR; INTRAVENOUS
Status: DISCONTINUED | OUTPATIENT
Start: 2019-01-14 | End: 2019-01-14 | Stop reason: HOSPADM

## 2019-01-14 RX ORDER — SODIUM CHLORIDE, SODIUM LACTATE, POTASSIUM CHLORIDE, CALCIUM CHLORIDE 600; 310; 30; 20 MG/100ML; MG/100ML; MG/100ML; MG/100ML
INJECTION, SOLUTION INTRAVENOUS ONCE
Status: DISCONTINUED | OUTPATIENT
Start: 2019-01-14 | End: 2019-01-14 | Stop reason: HOSPADM

## 2019-01-14 RX ORDER — OXYCODONE HCL 5 MG/5 ML
5 SOLUTION, ORAL ORAL
Status: COMPLETED | OUTPATIENT
Start: 2019-01-14 | End: 2019-01-14

## 2019-01-14 RX ORDER — HALOPERIDOL 5 MG/ML
1 INJECTION INTRAMUSCULAR
Status: DISCONTINUED | OUTPATIENT
Start: 2019-01-14 | End: 2019-01-14 | Stop reason: HOSPADM

## 2019-01-14 RX ORDER — ACETAMINOPHEN 500 MG
1000 TABLET ORAL EVERY 8 HOURS
Status: DISCONTINUED | OUTPATIENT
Start: 2019-01-14 | End: 2019-01-20 | Stop reason: HOSPADM

## 2019-01-14 RX ORDER — OXYCODONE HYDROCHLORIDE 5 MG/1
15 TABLET ORAL EVERY 4 HOURS PRN
Status: DISCONTINUED | OUTPATIENT
Start: 2019-01-14 | End: 2019-01-18

## 2019-01-14 RX ORDER — OXYCODONE HCL 5 MG/5 ML
SOLUTION, ORAL ORAL
Status: COMPLETED
Start: 2019-01-14 | End: 2019-01-14

## 2019-01-14 RX ORDER — METOPROLOL TARTRATE 1 MG/ML
1 INJECTION, SOLUTION INTRAVENOUS
Status: DISCONTINUED | OUTPATIENT
Start: 2019-01-14 | End: 2019-01-14 | Stop reason: HOSPADM

## 2019-01-14 RX ORDER — HYDROMORPHONE HYDROCHLORIDE 1 MG/ML
0.4 INJECTION, SOLUTION INTRAMUSCULAR; INTRAVENOUS; SUBCUTANEOUS
Status: DISCONTINUED | OUTPATIENT
Start: 2019-01-14 | End: 2019-01-14 | Stop reason: HOSPADM

## 2019-01-14 RX ORDER — ONDANSETRON 2 MG/ML
4 INJECTION INTRAMUSCULAR; INTRAVENOUS
Status: DISCONTINUED | OUTPATIENT
Start: 2019-01-14 | End: 2019-01-14 | Stop reason: HOSPADM

## 2019-01-14 RX ORDER — MIDAZOLAM HYDROCHLORIDE 1 MG/ML
1 INJECTION INTRAMUSCULAR; INTRAVENOUS
Status: DISCONTINUED | OUTPATIENT
Start: 2019-01-14 | End: 2019-01-14 | Stop reason: HOSPADM

## 2019-01-14 RX ORDER — HYDROMORPHONE HYDROCHLORIDE 1 MG/ML
0.2 INJECTION, SOLUTION INTRAMUSCULAR; INTRAVENOUS; SUBCUTANEOUS
Status: DISCONTINUED | OUTPATIENT
Start: 2019-01-14 | End: 2019-01-14 | Stop reason: HOSPADM

## 2019-01-14 RX ORDER — HYDROMORPHONE HYDROCHLORIDE 1 MG/ML
0.1 INJECTION, SOLUTION INTRAMUSCULAR; INTRAVENOUS; SUBCUTANEOUS
Status: DISCONTINUED | OUTPATIENT
Start: 2019-01-14 | End: 2019-01-14 | Stop reason: HOSPADM

## 2019-01-14 RX ORDER — SODIUM CHLORIDE, SODIUM LACTATE, POTASSIUM CHLORIDE, CALCIUM CHLORIDE 600; 310; 30; 20 MG/100ML; MG/100ML; MG/100ML; MG/100ML
INJECTION, SOLUTION INTRAVENOUS CONTINUOUS
Status: DISCONTINUED | OUTPATIENT
Start: 2019-01-14 | End: 2019-01-14 | Stop reason: HOSPADM

## 2019-01-14 RX ORDER — KETOROLAC TROMETHAMINE 30 MG/ML
30 INJECTION, SOLUTION INTRAMUSCULAR; INTRAVENOUS EVERY 6 HOURS PRN
Status: DISCONTINUED | OUTPATIENT
Start: 2019-01-14 | End: 2019-01-15

## 2019-01-14 RX ADMIN — ASPIRIN 325 MG ORAL TABLET 325 MG: 325 PILL ORAL at 06:21

## 2019-01-14 RX ADMIN — OXYCODONE HYDROCHLORIDE 5 MG: 5 TABLET ORAL at 08:34

## 2019-01-14 RX ADMIN — STANDARDIZED SENNA CONCENTRATE AND DOCUSATE SODIUM 2 TABLET: 8.6; 5 TABLET, FILM COATED ORAL at 06:21

## 2019-01-14 RX ADMIN — STANDARDIZED SENNA CONCENTRATE AND DOCUSATE SODIUM 2 TABLET: 8.6; 5 TABLET, FILM COATED ORAL at 17:59

## 2019-01-14 RX ADMIN — Medication 5 MG: at 15:53

## 2019-01-14 RX ADMIN — BUSPIRONE HYDROCHLORIDE 5 MG: 5 TABLET ORAL at 17:59

## 2019-01-14 RX ADMIN — DULOXETINE 60 MG: 30 CAPSULE, DELAYED RELEASE ORAL at 17:59

## 2019-01-14 RX ADMIN — HYDROMORPHONE HYDROCHLORIDE 1 MG: 1 INJECTION, SOLUTION INTRAMUSCULAR; INTRAVENOUS; SUBCUTANEOUS at 09:50

## 2019-01-14 RX ADMIN — BUSPIRONE HYDROCHLORIDE 2.5 MG: 5 TABLET ORAL at 20:38

## 2019-01-14 RX ADMIN — ACETAMINOPHEN 1000 MG: 500 TABLET, FILM COATED ORAL at 17:59

## 2019-01-14 RX ADMIN — KETOROLAC TROMETHAMINE 30 MG: 30 INJECTION, SOLUTION INTRAMUSCULAR at 23:52

## 2019-01-14 RX ADMIN — LORAZEPAM 0.5 MG: 2 INJECTION INTRAMUSCULAR; INTRAVENOUS at 06:26

## 2019-01-14 RX ADMIN — SODIUM CHLORIDE: 9 INJECTION, SOLUTION INTRAVENOUS at 12:23

## 2019-01-14 RX ADMIN — BUSPIRONE HYDROCHLORIDE 7.5 MG: 5 TABLET ORAL at 06:21

## 2019-01-14 RX ADMIN — SODIUM CHLORIDE: 9 INJECTION, SOLUTION INTRAVENOUS at 00:23

## 2019-01-14 RX ADMIN — DULOXETINE 60 MG: 30 CAPSULE, DELAYED RELEASE ORAL at 06:21

## 2019-01-14 RX ADMIN — OXYCODONE HYDROCHLORIDE 15 MG: 5 TABLET ORAL at 20:40

## 2019-01-14 RX ADMIN — OXYCODONE HYDROCHLORIDE 5 MG: 5 SOLUTION ORAL at 15:53

## 2019-01-14 RX ADMIN — OXYCODONE HYDROCHLORIDE 5 MG: 5 SOLUTION ORAL at 15:50

## 2019-01-14 RX ADMIN — OXYCODONE HYDROCHLORIDE AND ACETAMINOPHEN 1 TABLET: 10; 325 TABLET ORAL at 02:21

## 2019-01-14 RX ADMIN — KETOROLAC TROMETHAMINE 30 MG: 30 INJECTION, SOLUTION INTRAMUSCULAR at 18:04

## 2019-01-14 RX ADMIN — LORAZEPAM 0.5 MG: 2 INJECTION INTRAMUSCULAR; INTRAVENOUS at 00:06

## 2019-01-14 RX ADMIN — HYDROMORPHONE HYDROCHLORIDE 1 MG: 1 INJECTION, SOLUTION INTRAMUSCULAR; INTRAVENOUS; SUBCUTANEOUS at 03:06

## 2019-01-14 RX ADMIN — HYDROMORPHONE HYDROCHLORIDE 1 MG: 1 INJECTION, SOLUTION INTRAMUSCULAR; INTRAVENOUS; SUBCUTANEOUS at 00:05

## 2019-01-14 ASSESSMENT — PAIN SCALES - GENERAL
PAINLEVEL_OUTOF10: 8
PAINLEVEL_OUTOF10: 4
PAINLEVEL_OUTOF10: 6
PAINLEVEL_OUTOF10: 3
PAINLEVEL_OUTOF10: 8
PAINLEVEL_OUTOF10: 7
PAINLEVEL_OUTOF10: 0

## 2019-01-14 ASSESSMENT — ENCOUNTER SYMPTOMS
NAUSEA: 0
CHILLS: 0
DEPRESSION: 0
SPUTUM PRODUCTION: 0
DIARRHEA: 0
COUGH: 0
FEVER: 0
VOMITING: 0
FALLS: 0
SHORTNESS OF BREATH: 0
TINGLING: 0
STRIDOR: 0
MYALGIAS: 0
HEADACHES: 0
DIZZINESS: 0
ABDOMINAL PAIN: 1
LOSS OF CONSCIOUSNESS: 0
WEAKNESS: 0
PALPITATIONS: 0
CONSTIPATION: 0

## 2019-01-14 ASSESSMENT — PATIENT HEALTH QUESTIONNAIRE - PHQ9
SUM OF ALL RESPONSES TO PHQ9 QUESTIONS 1 AND 2: 0
2. FEELING DOWN, DEPRESSED, IRRITABLE, OR HOPELESS: NOT AT ALL
1. LITTLE INTEREST OR PLEASURE IN DOING THINGS: NOT AT ALL

## 2019-01-14 NOTE — PROGRESS NOTES
Patient does not show obstruction of the CBD.  Patient complains of severe pain.  Ativan and dilaudid given per MAR. Dr. Sepulveda spoke with family about MRI results.  New orders placed for Protonix.  Report given to Miladis SAUNDERS care turned over to her at this time.

## 2019-01-14 NOTE — OR NURSING
Patient to preop, allergies and NPO status verified, home medications reconciled, belongings secured, verbalizes understanding of pain scale, surgical site verified, IV access confirmed and hooked to LR, SCDs in place.

## 2019-01-14 NOTE — PROGRESS NOTES
[facility] Medicine Daily Progress Note    Date of Service  1/14/2019    Chief Complaint  63 y.o. female admitted 1/13/2019 with right upper quadrant abdominal pain.    Hospital Course    Patient states the pain started on the day of admission, associated with nausea and vomiting.  She took some Gas-X to relieve the discomfort however this did not work.  She did have a similar episode approximately a month ago however not as severe and it resolved.      Interval Problem Update  Upon arrival, there was concern for choledocholithiasis however labs were normal.  Patient had a right upper quadrant ultrasound that showed common bile duct dilatation, MRCP was ordered.  MRCP showed common bile duct dilatation, cholelithiasis but no choledocholithiasis.  I discussed the case with surgery.    Consultants/Specialty  Surgery    Code Status  Full    Disposition  Patient requires additional treatment in the hospital, should be able to go home without needs at the time of discharge    Review of Systems  Review of Systems   Constitutional: Negative for chills, fever and malaise/fatigue.   HENT: Negative for congestion.    Respiratory: Negative for cough, sputum production, shortness of breath and stridor.    Cardiovascular: Negative for chest pain, palpitations and leg swelling.   Gastrointestinal: Positive for abdominal pain. Negative for constipation, diarrhea, nausea and vomiting.   Genitourinary: Negative for dysuria and urgency.   Musculoskeletal: Negative for falls and myalgias.   Neurological: Negative for dizziness, tingling, loss of consciousness, weakness and headaches.   Psychiatric/Behavioral: Negative for depression and suicidal ideas.   All other systems reviewed and are negative.       Physical Exam  Temp:  [36.3 °C (97.4 °F)-36.9 °C (98.4 °F)] 36.5 °C (97.7 °F)  Pulse:  [61-80] 79  Resp:  [18-20] 20  BP: (100-163)/(50-83) 100/50    Physical Exam   Constitutional: She is oriented to person, place, and time. She  appears well-developed. She is cooperative. No distress.   HENT:   Head: Normocephalic and atraumatic. Not macrocephalic and not microcephalic. Head is without raccoon's eyes and without Leonard's sign.   Right Ear: External ear normal.   Left Ear: External ear normal.   Mouth/Throat: Oropharynx is clear and moist. No oropharyngeal exudate.   Eyes: Conjunctivae are normal. Right eye exhibits no discharge. Left eye exhibits no discharge. No scleral icterus.   Neck: Neck supple. No tracheal deviation present.   Cardiovascular: Normal rate, regular rhythm and intact distal pulses.  Exam reveals no gallop, no distant heart sounds and no friction rub.    No murmur heard.  Pulmonary/Chest: Effort normal. No accessory muscle usage or stridor. No tachypnea and no bradypnea. No respiratory distress. She has no decreased breath sounds. She has no wheezes. She has no rhonchi. She has no rales. She exhibits no tenderness.   Abdominal: Soft. Bowel sounds are normal. She exhibits no distension. There is no hepatosplenomegaly, splenomegaly or hepatomegaly. There is tenderness in the right upper quadrant. There is no rebound and no guarding.   Musculoskeletal: Normal range of motion. She exhibits no edema or tenderness.   Lymphadenopathy:     She has no cervical adenopathy.   Neurological: She is alert and oriented to person, place, and time. No cranial nerve deficit. She displays no seizure activity.   Skin: Skin is warm, dry and intact. No rash noted. She is not diaphoretic. No erythema. No pallor.   Psychiatric: She has a normal mood and affect. Her speech is normal and behavior is normal. Judgment and thought content normal. Cognition and memory are normal.   Nursing note and vitals reviewed.      Fluids    Intake/Output Summary (Last 24 hours) at 01/14/19 1032  Last data filed at 01/13/19 2000   Gross per 24 hour   Intake               50 ml   Output                0 ml   Net               50 ml       Laboratory  Recent Labs       01/13/19 0923 01/14/19 0449   WBC  6.3  10.0   RBC  4.59  4.49   HEMOGLOBIN  14.1  13.7   HEMATOCRIT  40.3  41.6   MCV  87.8  92.7   MCH  30.7  30.5   MCHC  35.0  32.9*   RDW  39.7  43.2   PLATELETCT  227  206   MPV  9.7  10.3     Recent Labs      01/13/19 0923 01/14/19 0449   SODIUM  136  139   POTASSIUM  4.0  3.6   CHLORIDE  105  104   CO2  20  28   GLUCOSE  143*  122*   BUN  20  15   CREATININE  0.70  0.71   CALCIUM  9.1  8.3*     Recent Labs      01/13/19 0923   APTT  22.3*   INR  0.95     Recent Labs      01/13/19 0923   BNPBTYPENAT  15           Imaging  [id number]-X/O   Final Result         1. No choledocholithiasis.   2. Unchanged 10 mm dilatation of the CBD, nonspecific. No pancreatic ductal dilatation.   3. Cholelithiasis. No evidence of cholecystitis.   4. Colonic diverticulosis.      CT-CTA COMPLETE THORACOABDOMINAL AORTA   Final Result      1.  No aortic aneurysm or dissection. No pulmonary embolus.   2.  Colonic diverticulosis.   3.  Mild circumferential mural thickening of the transverse and descending colon, likely related to underdistention or sequela of prior colitis. Acute colitis is less likely.         US-RUQ   Final Result         1. Cholelithiasis. No evidence of cholecystitis.   2. Dilated, 10 mm CBD. If there is clinical concern for choledocholithiasis, consider MRCP.   3. Increased hepatic echogenicity, suggestive of hepatic steatosis or hepatocellular disease.      DX-CHEST-PORTABLE (1 VIEW)   Final Result      No acute cardiopulmonary abnormality.           Assessment/Plan  Choledocholithiasis   Assessment & Plan    -Certainly the diagnosis, initially upon arrival, stone had passed and labs had not changed yet  -Now on this morning's labs, are elevated liver enzymes, common bile duct remains on MRCP  -I discussed the case with surgery who agrees, patient will be made n.p.o. for cholecystectomy today     Elevated liver enzymes   Assessment & Plan    -Due to  choledocholithiasis, stone had already passed prior to arrival, does have additional cholelithiasis     Chronic narcotic dependence (HCC)   Assessment & Plan    -Patient is on Percocet at home, will be discharged on this dose, should not need increasing dose     Chest pain   Assessment & Plan    -Actual right upper quadrant pain, no ACS          VTE prophylaxis: SCDs

## 2019-01-14 NOTE — CARE PLAN
Problem: Communication  Goal: The ability to communicate needs accurately and effectively will improve  Outcome: PROGRESSING AS EXPECTED  Encourage pt to voice concerns and feelings.     Problem: Safety  Goal: Will remain free from falls  Outcome: PROGRESSING AS EXPECTED  Hourly rounding, bed low and locked, nonskid socks on, call bell within reach.

## 2019-01-14 NOTE — OP REPORT
DATE OF OPERATION: January 14, 2019    PREOPERATIVE DIAGNOSIS: Cholelithiasis with transient obstruction, chronic pain  POSTOPERATIVE DIAGNOSIS: Cholelithiasis with transient obstruction, chronic pain    PROCEDURE: Laparoscopic cholecystectomy    SURGEON: Doris Baldwin MD   ASSISTANT:  uArora Grewal MS3    ANESTHESIOLOGIST: Dr. Betancourt with [facility]    SPECIMENS: Gallbladder and contents.     FINDINGS: Edematous gallbladder without adhesions    INDICATIONS: Ms. Tsang is a healthy 63 year old woman who presented now with two episodes of epigastric pain likely secondary to transient obstruction of the common bile duct from cholelithiasis. We discussed definitive surgical therapy with risks including, but   not limited to bleeding, infection, damage to the common duct, possibly   requiring hepaticojejunostomy. Patient understood and agreed to proceed.     DESCRIPTION OF PROCEDURE: The patient was brought to the operating room. She   was placed in a comfortable supine position on the operating room table with   all appropriate points padded. General anesthesia was induced without   complication. A time-out was held and completed confirming correct patient,   correct site, correct procedure and SCDs on and functioning. She received   Ancef prior to incision. After prepping the abdomen with ChloraPrep and   draping in usual sterile fashion, 0.25% Marcaine with epinephrine was   infiltrated supraumbilically and a 1-cm incision was made in this area. This   was taken down the fascia bluntly using a hemostat and a penetrating towel   clip was used to grasp the umbilical stalk and elevate the abdominal wall. A   Veress needle was placed into the abdomen and a saline drop test showed no   evidence of injury to bowel or vessel. I then insufflated the abdomen to a   pressure of 15 mmHg with CO2. An 11-mm trocar was placed through this   incision into the abdomen and a camera was then inserted confirming no   evidence of  injury to bowel or vessel.  I then placed an 11-mm   trocar in the epigastrium and two 5-mm trocars in the right upper quadrant   under direct visualization after appropriate numbing of the skin. My assistant grasped   the fundus of the gallbladder lifted up over the liver edge. The gallbladder was moderately tense, and retracting it put a hole in the wall. Clear green bile spilled, but no stones. I then grasped the   infundibulum of the gallbladder and   retracted it laterally and circumferentially dissected the cystic duct and the   cystic artery from lateral to medial using a [place] grasper, obtaining the critical view of safety. I then placed   three 10-mm clips on the patient's side and one on the specimen side on the   cystic duct and divided sharply. I then placed 2 clips on the cystic artery   on the patient's side and one on the specimen side and divided sharply and   removed the gallbladder from the liver bed using Bovie electrocautery.The gallbladder was then placed in an EndoCatch   bag and removed through the epigastric port incision. I then obtained   excellent hemostasis and copiously irrigated the area with 2 liters of warm   saline until entirely clear. I closed the fascia on the epigastric port using   a single interrupted 0 Vicryl with an Endoclose under direct visualization   and then removed the two 5-mm ports confirming good hemostasis. I closed the   fascia on the umbilical port using a single interrupted 0 Vicryl suture and   irrigated all 4 incisions. I then closed the skin using a single running 4-0   Vicryl in a subcuticular fashion. These were then dressed with dry dressings   and patient was awoken from anesthesia in good condition having tolerated the   procedure well. All counts were correct at the end of the case x2.

## 2019-01-14 NOTE — CONSULTS
Surgery General History & Physical Note    Date  1/14/2019    Primary Care Physician  Micheline Walker D.O.    CC  Abdominal pain, nausea and vomiting    HPI  This is a 63 y.o. female who presented with one day of severe, increasing epigastric and right upper quadrant pain. She woke up at 4AM with the pain, and had one episode of vomiting along with severe nausea. She denies any bloating, jaundice or acholic stools. She had a similar episode last month, which was not quite as severe and resolved without any treatment. She does note that she has had long standing episodes of mild abdominal pain/gassiness which usually resolves within hours with walking and drinking soda. She doesn't have any family history of gallbladder problems    Past Medical History:   Diagnosis Date   • Chronic pain    Followed by Varun MARTINEZ at [facility]    Past Surgical History:   Procedure Laterality Date   • ACL REPAIR Right    • HIP ARTHROPLASTY TOTAL Right    • HYSTERECTOMY, TOTAL ABDOMINAL         Current Facility-Administered Medications   Medication Dose Route Frequency Provider Last Rate Last Dose   • busPIRone (BUSPAR) tablet 7.5 mg  7.5 mg Oral BID Karie Sepulveda M.D.   7.5 mg at 01/14/19 0621   • cyclobenzaprine (FLEXERIL) tablet 10 mg  10 mg Oral TID PRN Karie Sepulveda M.D.   10 mg at 01/13/19 2009   • DULoxetine (CYMBALTA) capsule 60 mg  60 mg Oral BID Karie Sepulveda M.D.   60 mg at 01/14/19 0621   • oxyCODONE-acetaminophen (PERCOCET-10)  MG per tablet 0.5-1 Tab  0.5-1 Tab Oral Q6HRS PRN Karie Sepulveda M.D.   1 Tab at 01/14/19 0221   • aspirin (ASA) tablet 325 mg  325 mg Oral DAILY Karie Sepulveda M.D.   325 mg at 01/14/19 0621    Or   • aspirin (ASA) chewable tab 324 mg  324 mg Oral DAILY Karie Sepulveda M.D.        Or   • aspirin (ASA) suppository 300 mg  300 mg Rectal DAILY Karie Sepulveda M.D.       • senna-docusate (PERICOLACE or SENOKOT S) 8.6-50 MG per tablet 2 Tab  2 Tab Oral BID Karie Sepulveda M.D.   2 Tab  "at 01/14/19 0621    And   • polyethylene glycol/lytes (MIRALAX) PACKET 1 Packet  1 Packet Oral QDAY PRALTHEA Sepulveda M.D.        And   • magnesium hydroxide (MILK OF MAGNESIA) suspension 30 mL  30 mL Oral QDAY PRALTHEA Sepulveda M.D.        And   • bisacodyl (DULCOLAX) suppository 10 mg  10 mg Rectal QDAY PRALTHEA Sepulveda M.D.       • NS infusion   Intravenous Continuous Karie Sepulveda M.D. 125 mL/hr at 01/14/19 0023     • ondansetron (ZOFRAN) syringe/vial injection 4 mg  4 mg Intravenous Q4HRS PRALTHEA Sepulveda M.D.   4 mg at 01/13/19 2010   • ondansetron (ZOFRAN ODT) dispertab 4 mg  4 mg Oral Q4HRS PRALTHEA Sepulveda M.D.       • promethazine (PHENERGAN) tablet 12.5-25 mg  12.5-25 mg Oral Q4HRS ALYSE Sepulveda M.D.       • promethazine (PHENERGAN) suppository 12.5-25 mg  12.5-25 mg Rectal Q4HRS PRALTHEA Sepulveda M.D.       • prochlorperazine (COMPAZINE) injection 5-10 mg  5-10 mg Intravenous Q4HRS PRALTHEA Sepulveda M.D.       • Pharmacy Consult Request ...Pain Management Review   Other ALYSE Sepulveda M.D.        And   • oxyCODONE immediate-release (ROXICODONE) tablet 2.5 mg  2.5 mg Oral Q3HRS PRALTHEA Sepulveda M.D.        And   • oxyCODONE immediate-release (ROXICODONE) tablet 5 mg  5 mg Oral Q3HRS PRALTHEA Sepulveda M.D.   5 mg at 01/14/19 0834    And   • HYDROmorphone pf (DILAUDID) injection 1 mg  1 mg Intravenous Q3HRS PRALTHEA Sepulveda M.D.   1 mg at 01/14/19 0950   • LORazepam (ATIVAN) injection 0.5 mg  0.5 mg Intravenous Q4HRS PRALTHEA Sepulveda, M.D.   0.5 mg at 01/14/19 0626       Social History     Social History   • Marital status:      Spouse name: N/A   • Number of children: N/A   • Years of education: N/A     Occupational History   • Not on file.     Social History Main Topics   • Smoking status: Never Smoker   • Smokeless tobacco: Never Used   • Alcohol use Yes      Comment: \"wine with dinner\"   • Drug use: Unknown   • Sexual activity: Not on file     Other Topics Concern   • " Not on file     Social History Narrative   • No narrative on file       Family History   Problem Relation Age of Onset   • No Known Problems Mother        Allergies  Patient has no known allergies.    Review of Systems  Negative except for as described in the HPI    Physical Exam   Constitutional: She is oriented to person, place, and time. She appears well-developed and well-nourished. No distress.   HENT:   Head: Normocephalic and atraumatic.   Eyes: Conjunctivae are normal.   Neck: Normal range of motion.   Cardiovascular: Normal rate and regular rhythm.    Pulmonary/Chest: Effort normal.   Abdominal: Soft. She exhibits no distension. There is tenderness. There is no rebound and no guarding.   No Rosario's sign   Musculoskeletal: Normal range of motion. She exhibits no edema or tenderness.   Neurological: She is alert and oriented to person, place, and time.   Skin: Skin is warm and dry. She is not diaphoretic.   Psychiatric: She has a normal mood and affect. Her behavior is normal.       Vital Signs  Blood Pressure: 100/50   Temperature: 36.5 °C (97.7 °F)   Pulse: 79   Respiration: 20   Pulse Oximetry: 92 %       Labs:  Recent Labs      01/13/19 0923 01/14/19 0449   WBC  6.3  10.0   RBC  4.59  4.49   HEMOGLOBIN  14.1  13.7   HEMATOCRIT  40.3  41.6   MCV  87.8  92.7   MCH  30.7  30.5   MCHC  35.0  32.9*   RDW  39.7  43.2   PLATELETCT  227  206   MPV  9.7  10.3     Recent Labs      01/13/19 0923 01/14/19 0449   SODIUM  136  139   POTASSIUM  4.0  3.6   CHLORIDE  105  104   CO2  20  28   GLUCOSE  143*  122*   BUN  20  15   CREATININE  0.70  0.71   CALCIUM  9.1  8.3*     Recent Labs      01/13/19 0923   APTT  22.3*   INR  0.95     Recent Labs      01/13/19 0923 01/14/19 0449   ASTSGOT  23  273*   ALTSGPT  23  117*   TBILIRUBIN  0.9  2.2*   ALKPHOSPHAT  73  106*   GLOBULIN  2.9  2.5   INR  0.95   --        Radiology:  [id number]   Final Result         1. No choledocholithiasis.   2. Unchanged 10 mm  dilatation of the CBD, nonspecific. No pancreatic ductal dilatation.   3. Cholelithiasis. No evidence of cholecystitis.   4. Colonic diverticulosis.      CT-CTA COMPLETE THORACOABDOMINAL AORTA   Final Result      1.  No aortic aneurysm or dissection. No pulmonary embolus.   2.  Colonic diverticulosis.   3.  Mild circumferential mural thickening of the transverse and descending colon, likely related to underdistention or sequela of prior colitis. Acute colitis is less likely.         US-RUQ   Final Result         1. Cholelithiasis. No evidence of cholecystitis.   2. Dilated, 10 mm CBD. If there is clinical concern for choledocholithiasis, consider MRCP.   3. Increased hepatic echogenicity, suggestive of hepatic steatosis or hepatocellular disease.      DX-CHEST-PORTABLE (1 VIEW)   Final Result      No acute cardiopulmonary abnormality.        On my review of the images, there is cholelithiasis without evidence of cholecystitis. The stones are small. No gallbladder wall thickening or pericholecystic fluid.     Assessment/Plan:  Cholelithiasis without cholecystitis with evidence of transient obstruction. No current choledocholithiasis on MRCP.   I discussed the imaging findings with Alba and her , who is at bedside. It is my impression that she has now had two episodes of choledocholithiasis in the last month, and luckily passed both stones spontaneously. She has no other risk factors for her elevated LFTs and alkaline phosphatase. Since her MRCP shows a clear common bile duct currently, she has no need of an ERCP. I discussed surgical therapy, with risks, benefits and alternatives. We discussed the high likelihood of future episodes, with possible consequences of severe pancreatitis and death. We discussed risks, including, but not limited to, bleeding, infection, damage to surrounding structures such as small or large intestine, damage to the common bile duct possibly requiring hepaticojejunostomy, possible  cystic duct stump leak requiring further procedures, hernia, need for an open procedure. We also discussed the typical post operative recovery course and activity restrictions. All questions were answered to the patient's apparent satisfaction and they agreed to proceed this afternoon at 2PM. I have also contacted her pain management provider and they will be writing post operative pain medication prescriptions.

## 2019-01-14 NOTE — PROGRESS NOTES
Report recvd. POC discussed.  at bedside. Pt complaining of pain. Pain will be addressed. Call bell within reach. Will continue to monitor.

## 2019-01-15 PROBLEM — F39 MOOD DISORDER (HCC): Status: ACTIVE | Noted: 2019-01-15

## 2019-01-15 LAB
ALBUMIN SERPL BCP-MCNC: 2.7 G/DL (ref 3.2–4.9)
ALBUMIN/GLOB SERPL: 1.1 G/DL
ALP SERPL-CCNC: 205 U/L (ref 30–99)
ALT SERPL-CCNC: 653 U/L (ref 2–50)
ANION GAP SERPL CALC-SCNC: 5 MMOL/L (ref 0–11.9)
APPEARANCE UR: ABNORMAL
AST SERPL-CCNC: 640 U/L (ref 12–45)
BILIRUB SERPL-MCNC: 2.6 MG/DL (ref 0.1–1.5)
BUN SERPL-MCNC: 9 MG/DL (ref 8–22)
CALCIUM SERPL-MCNC: 7.8 MG/DL (ref 8.4–10.2)
CHLORIDE SERPL-SCNC: 105 MMOL/L (ref 96–112)
CO2 SERPL-SCNC: 28 MMOL/L (ref 20–33)
COLOR UR: ABNORMAL
CREAT SERPL-MCNC: 0.69 MG/DL (ref 0.5–1.4)
EPI CELLS #/AREA URNS HPF: ABNORMAL /HPF
ERYTHROCYTE [DISTWIDTH] IN BLOOD BY AUTOMATED COUNT: 43.4 FL (ref 35.9–50)
GLOBULIN SER CALC-MCNC: 2.4 G/DL (ref 1.9–3.5)
GLUCOSE SERPL-MCNC: 112 MG/DL (ref 65–99)
HCT VFR BLD AUTO: 32.3 % (ref 37–47)
HGB BLD-MCNC: 10.6 G/DL (ref 12–16)
MCH RBC QN AUTO: 30.5 PG (ref 27–33)
MCHC RBC AUTO-ENTMCNC: 32.8 G/DL (ref 33.6–35)
MCV RBC AUTO: 93.1 FL (ref 81.4–97.8)
MICRO URNS: ABNORMAL
MUCOUS THREADS #/AREA URNS HPF: ABNORMAL /HPF
PH UR STRIP.AUTO: ABNORMAL [PH]
PLATELET # BLD AUTO: 146 K/UL (ref 164–446)
PMV BLD AUTO: 10.3 FL (ref 9–12.9)
POTASSIUM SERPL-SCNC: 3.7 MMOL/L (ref 3.6–5.5)
PROT SERPL-MCNC: 5.1 G/DL (ref 6–8.2)
RBC # BLD AUTO: 3.47 M/UL (ref 4.2–5.4)
RBC # URNS HPF: ABNORMAL /HPF
SODIUM SERPL-SCNC: 138 MMOL/L (ref 135–145)
SP GR UR REFRACTOMETRY: 1.03
WBC # BLD AUTO: 8.2 K/UL (ref 4.8–10.8)
WBC #/AREA URNS HPF: ABNORMAL /HPF

## 2019-01-15 PROCEDURE — 99225 PR SUBSEQUENT OBSERVATION CARE,LEVEL II: CPT | Performed by: INTERNAL MEDICINE

## 2019-01-15 PROCEDURE — 81001 URINALYSIS AUTO W/SCOPE: CPT

## 2019-01-15 PROCEDURE — 85027 COMPLETE CBC AUTOMATED: CPT

## 2019-01-15 PROCEDURE — A9270 NON-COVERED ITEM OR SERVICE: HCPCS | Performed by: SURGERY

## 2019-01-15 PROCEDURE — A9270 NON-COVERED ITEM OR SERVICE: HCPCS | Performed by: HOSPITALIST

## 2019-01-15 PROCEDURE — A9270 NON-COVERED ITEM OR SERVICE: HCPCS | Performed by: INTERNAL MEDICINE

## 2019-01-15 PROCEDURE — 700102 HCHG RX REV CODE 250 W/ 637 OVERRIDE(OP): Performed by: HOSPITALIST

## 2019-01-15 PROCEDURE — 700111 HCHG RX REV CODE 636 W/ 250 OVERRIDE (IP): Performed by: SURGERY

## 2019-01-15 PROCEDURE — 700102 HCHG RX REV CODE 250 W/ 637 OVERRIDE(OP): Performed by: SURGERY

## 2019-01-15 PROCEDURE — 80053 COMPREHEN METABOLIC PANEL: CPT

## 2019-01-15 PROCEDURE — 700111 HCHG RX REV CODE 636 W/ 250 OVERRIDE (IP): Performed by: HOSPITALIST

## 2019-01-15 PROCEDURE — 96376 TX/PRO/DX INJ SAME DRUG ADON: CPT

## 2019-01-15 PROCEDURE — G0378 HOSPITAL OBSERVATION PER HR: HCPCS

## 2019-01-15 PROCEDURE — 700102 HCHG RX REV CODE 250 W/ 637 OVERRIDE(OP): Performed by: INTERNAL MEDICINE

## 2019-01-15 RX ORDER — CYCLOBENZAPRINE HCL 10 MG
10 TABLET ORAL 3 TIMES DAILY PRN
Status: DISCONTINUED | OUTPATIENT
Start: 2019-01-15 | End: 2019-01-15

## 2019-01-15 RX ORDER — LORAZEPAM 1 MG/1
1 TABLET ORAL EVERY 4 HOURS PRN
Status: DISCONTINUED | OUTPATIENT
Start: 2019-01-15 | End: 2019-01-20 | Stop reason: HOSPADM

## 2019-01-15 RX ORDER — LORAZEPAM 2 MG/ML
1 INJECTION INTRAMUSCULAR EVERY 4 HOURS PRN
Status: DISCONTINUED | OUTPATIENT
Start: 2019-01-15 | End: 2019-01-15

## 2019-01-15 RX ADMIN — LORAZEPAM 1 MG: 1 TABLET ORAL at 18:28

## 2019-01-15 RX ADMIN — ASPIRIN 325 MG ORAL TABLET 325 MG: 325 PILL ORAL at 05:30

## 2019-01-15 RX ADMIN — BUSPIRONE HYDROCHLORIDE 7.5 MG: 5 TABLET ORAL at 05:29

## 2019-01-15 RX ADMIN — LORAZEPAM 1 MG: 1 TABLET ORAL at 14:56

## 2019-01-15 RX ADMIN — STANDARDIZED SENNA CONCENTRATE AND DOCUSATE SODIUM 2 TABLET: 8.6; 5 TABLET, FILM COATED ORAL at 17:34

## 2019-01-15 RX ADMIN — OXYCODONE HYDROCHLORIDE 15 MG: 5 TABLET ORAL at 18:28

## 2019-01-15 RX ADMIN — HYDROMORPHONE HYDROCHLORIDE 1 MG: 1 INJECTION, SOLUTION INTRAMUSCULAR; INTRAVENOUS; SUBCUTANEOUS at 14:33

## 2019-01-15 RX ADMIN — LORAZEPAM 1 MG: 1 TABLET ORAL at 22:40

## 2019-01-15 RX ADMIN — ACETAMINOPHEN 1000 MG: 500 TABLET, FILM COATED ORAL at 17:33

## 2019-01-15 RX ADMIN — OXYCODONE HYDROCHLORIDE 15 MG: 5 TABLET ORAL at 12:26

## 2019-01-15 RX ADMIN — STANDARDIZED SENNA CONCENTRATE AND DOCUSATE SODIUM 2 TABLET: 8.6; 5 TABLET, FILM COATED ORAL at 05:29

## 2019-01-15 RX ADMIN — CYCLOBENZAPRINE HYDROCHLORIDE 10 MG: 10 TABLET, FILM COATED ORAL at 14:43

## 2019-01-15 RX ADMIN — KETOROLAC TROMETHAMINE 30 MG: 30 INJECTION, SOLUTION INTRAMUSCULAR at 06:45

## 2019-01-15 RX ADMIN — DULOXETINE 60 MG: 30 CAPSULE, DELAYED RELEASE ORAL at 05:29

## 2019-01-15 RX ADMIN — OXYCODONE HYDROCHLORIDE 15 MG: 5 TABLET ORAL at 22:40

## 2019-01-15 RX ADMIN — BUSPIRONE HYDROCHLORIDE 7.5 MG: 5 TABLET ORAL at 17:28

## 2019-01-15 RX ADMIN — DULOXETINE 60 MG: 30 CAPSULE, DELAYED RELEASE ORAL at 17:33

## 2019-01-15 RX ADMIN — OXYCODONE HYDROCHLORIDE 15 MG: 5 TABLET ORAL at 02:41

## 2019-01-15 ASSESSMENT — ENCOUNTER SYMPTOMS
SPUTUM PRODUCTION: 0
PALPITATIONS: 0
SORE THROAT: 0
BACK PAIN: 1
DIARRHEA: 0
NAUSEA: 0
FEVER: 0
SHORTNESS OF BREATH: 1
CONSTIPATION: 0
COUGH: 1
CHILLS: 0
WEAKNESS: 0
VOMITING: 0
ABDOMINAL PAIN: 1
DIZZINESS: 0
HEADACHES: 0
SINUS PAIN: 0
NERVOUS/ANXIOUS: 1

## 2019-01-15 ASSESSMENT — PAIN SCALES - GENERAL
PAINLEVEL_OUTOF10: 6
PAINLEVEL_OUTOF10: 8
PAINLEVEL_OUTOF10: 8
PAINLEVEL_OUTOF10: 7
PAINLEVEL_OUTOF10: 3
PAINLEVEL_OUTOF10: 5
PAINLEVEL_OUTOF10: 9
PAINLEVEL_OUTOF10: 4
PAINLEVEL_OUTOF10: 3
PAINLEVEL_OUTOF10: 3

## 2019-01-15 NOTE — OR NURSING
Respirations easy in PACU. Lungs clear throughout. Four op sites without bleeding, dermabond in place. Pain meds with good effect.  Patient denies nausea.  Dr. Mayen here in PACU to talk with patient.

## 2019-01-15 NOTE — CARE PLAN
Problem: Knowledge Deficit  Goal: Knowledge of disease process/condition, treatment plan, diagnostic tests, and medications will improve  Outcome: PROGRESSING AS EXPECTED  Pt knowledge level assessed. Pt educated on disease process/condition, POC, diagnostic tests, and medications. Pt verbalized understanding of care plan.       Problem: Pain Management  Goal: Pain level will decrease to patient's comfort goal  Outcome: PROGRESSING AS EXPECTED  Pain assessment complete. Pt encouraged to report pain. Pain scale 1-10 in use. Pt medicated per MAR. Positioning, rest, and pillows used for comfort. Pt verbalized understanding of pain management care plan.

## 2019-01-15 NOTE — PROGRESS NOTES
Hourly rounding complete. Pt ambulated to restroom, SBA, steady gait. Pt did become incontinent of urine while walking toward the bathroom. This is a new occurrence for her, just since she has been post-op yesterday. Pt also medicated with PRN toradol for pain per MAR. Safety precautions in place. Call light in reach.

## 2019-01-15 NOTE — PROGRESS NOTES
Progress Note               Author: Doris C iTarra Date & Time created: 1/15/2019  6:21 AM     Interval History:  Feeling relatively well, though sore. No N/V. Feeling hungry.     Review of Systems:  Review of Systems   Constitutional: Negative for chills and fever.   Gastrointestinal: Positive for abdominal pain. Negative for nausea and vomiting.       Physical Exam:  Physical Exam   Constitutional: She is oriented to person, place, and time. She appears well-developed and well-nourished. No distress.   HENT:   Head: Normocephalic and atraumatic.   Eyes: Conjunctivae are normal.   Neck: Normal range of motion.   Cardiovascular: Normal rate and regular rhythm.    Pulmonary/Chest: Effort normal. No respiratory distress.   Abdominal: Soft. She exhibits no distension. There is tenderness.   Mild appropriate incisional tenderness. Mild ecchymosis around incisions, no evidence of hematoma   Musculoskeletal: Normal range of motion.   Neurological: She is alert and oriented to person, place, and time.   Skin: Skin is warm and dry. No rash noted. She is not diaphoretic. No erythema.   Psychiatric: She has a normal mood and affect. Her behavior is normal.       Labs:      Recent Labs      01/13/19 0923 01/13/19   1400  01/13/19   1758   TROPONINI  <0.02  <0.02  <0.02   BNPBTYPENAT  15   --    --      Recent Labs      01/13/19 0923 01/14/19 0449   SODIUM  136  139   POTASSIUM  4.0  3.6   CHLORIDE  105  104   CO2  20  28   BUN  20  15   CREATININE  0.70  0.71   CALCIUM  9.1  8.3*     Recent Labs      01/13/19 0923 01/14/19 0449   ALTSGPT  23  117*   ASTSGOT  23  273*   ALKPHOSPHAT  73  106*   TBILIRUBIN  0.9  2.2*   LIPASE  30   --    GLUCOSE  143*  122*     Recent Labs      01/13/19 0923 01/14/19   0449  01/15/19   0443   RBC  4.59  4.49  3.47*   HEMOGLOBIN  14.1  13.7  10.6*   HEMATOCRIT  40.3  41.6  32.3*   PLATELETCT  227  206  146*   PROTHROMBTM  12.6   --    --    APTT  22.3*   --    --    INR  0.95    --    --      Recent Labs      19   0923  19   0449  01/15/19   0443   WBC  6.3  10.0  8.2   NEUTSPOLYS  76.30*  86.70*   --    LYMPHOCYTES  16.10*  5.80*   --    MONOCYTES  6.10  6.90   --    EOSINOPHILS  0.50  0.10   --    BASOPHILS  0.50  0.20   --    ASTSGOT  23  273*   --    ALTSGPT  23  117*   --    ALKPHOSPHAT  73  106*   --    TBILIRUBIN  0.9  2.2*   --      Hemodynamics:  Temp (24hrs), Av °C (98.6 °F), Min:36.5 °C (97.7 °F), Max:37.3 °C (99.2 °F)  Temperature: 36.8 °C (98.3 °F)  Pulse  Av.3  Min: 60  Max: 105Heart Rate (Monitored): (!) 101  Blood Pressure: 134/68     Respiratory:    Respiration: 18, Pulse Oximetry: 94 %        RUL Breath Sounds: Clear, RML Breath Sounds: Clear, RLL Breath Sounds: Diminished, CHRISTINA Breath Sounds: Clear, LLL Breath Sounds: Diminished  Fluids:    Intake/Output Summary (Last 24 hours) at 01/15/19 0621  Last data filed at 01/15/19 0400   Gross per 24 hour   Intake             2560 ml   Output               50 ml   Net             2510 ml        GI/Nutrition:  Orders Placed This Encounter   Procedures   • Diet Order Clear Liquid     Standing Status:   Standing     Number of Occurrences:   1     Order Specific Question:   Diet:     Answer:   Clear Liquid [10]     Medical Decision Making, by Problem:  Active Hospital Problems    Diagnosis   • Choledocholithiasis [K80.50]   • Elevated liver enzymes [R74.8]   • Chest pain [R07.9]   • Chronic narcotic dependence (HCC) [F11.20]       Plan:  POD 1 lap jef.   Advance to low fat regular diet.   Encouraged ambulation. Ok to shower.   Ordered UA as patient is having some urgency and dysuria.   CMP not back yet this morning, CBC shows decrease in WBC, H/H and platelets. Likely 2/2 fluid resuscitation, no signs of surgical bleeding, no tachycardia or hypotension.   If she does well with diet and LFTs are better, ok from my standpoint to d/c home today.   F/u with me in 2 weeks.   Discussed post operative care  instructions - no baths/swimming for two weeks, no lifting more than 20 pounds for the next four weeks, no driving while taking increased narcotic dose.     1640 addendum - CMP back, transaminases, alk phos and bilirubin increased, likely due to surgical trauma, recommend rechecking tomorrow    Quality-Core Measures

## 2019-01-15 NOTE — PROGRESS NOTES
Pt is beginning to experience mild dysuria and feels that she may be getting a UTI, as it feels similar to UTIs she has had in the past. Pt wants MD to be made aware at morning rounds in case she forgets to mention it. Will notify day shift.

## 2019-01-15 NOTE — PROGRESS NOTES
Report given to Elijah SAUNDERS. POC discussed. Pt resting comfortably in bed. Safety precautions in place.

## 2019-01-16 PROBLEM — K85.10 GALLSTONE PANCREATITIS: Status: ACTIVE | Noted: 2019-01-16

## 2019-01-16 LAB
ALBUMIN SERPL BCP-MCNC: 2.6 G/DL (ref 3.2–4.9)
ALBUMIN/GLOB SERPL: 1 G/DL
ALP SERPL-CCNC: 209 U/L (ref 30–99)
ALT SERPL-CCNC: 437 U/L (ref 2–50)
ANION GAP SERPL CALC-SCNC: 7 MMOL/L (ref 0–11.9)
AST SERPL-CCNC: 248 U/L (ref 12–45)
BILIRUB SERPL-MCNC: 3.6 MG/DL (ref 0.1–1.5)
BUN SERPL-MCNC: 11 MG/DL (ref 8–22)
CALCIUM SERPL-MCNC: 8.1 MG/DL (ref 8.4–10.2)
CHLORIDE SERPL-SCNC: 101 MMOL/L (ref 96–112)
CO2 SERPL-SCNC: 27 MMOL/L (ref 20–33)
CREAT SERPL-MCNC: 0.84 MG/DL (ref 0.5–1.4)
GLOBULIN SER CALC-MCNC: 2.5 G/DL (ref 1.9–3.5)
GLUCOSE SERPL-MCNC: 100 MG/DL (ref 65–99)
LIPASE SERPL-CCNC: >400 U/L (ref 7–58)
POTASSIUM SERPL-SCNC: 3.3 MMOL/L (ref 3.6–5.5)
PROT SERPL-MCNC: 5.1 G/DL (ref 6–8.2)
SODIUM SERPL-SCNC: 135 MMOL/L (ref 135–145)

## 2019-01-16 PROCEDURE — A9270 NON-COVERED ITEM OR SERVICE: HCPCS | Performed by: HOSPITALIST

## 2019-01-16 PROCEDURE — 700111 HCHG RX REV CODE 636 W/ 250 OVERRIDE (IP): Performed by: INTERNAL MEDICINE

## 2019-01-16 PROCEDURE — 700102 HCHG RX REV CODE 250 W/ 637 OVERRIDE(OP): Performed by: SURGERY

## 2019-01-16 PROCEDURE — 770020 HCHG ROOM/CARE - TELE (206)

## 2019-01-16 PROCEDURE — 99233 SBSQ HOSP IP/OBS HIGH 50: CPT | Performed by: INTERNAL MEDICINE

## 2019-01-16 PROCEDURE — A9270 NON-COVERED ITEM OR SERVICE: HCPCS | Performed by: SURGERY

## 2019-01-16 PROCEDURE — A9270 NON-COVERED ITEM OR SERVICE: HCPCS | Performed by: INTERNAL MEDICINE

## 2019-01-16 PROCEDURE — 96372 THER/PROPH/DIAG INJ SC/IM: CPT

## 2019-01-16 PROCEDURE — 80053 COMPREHEN METABOLIC PANEL: CPT

## 2019-01-16 PROCEDURE — 700102 HCHG RX REV CODE 250 W/ 637 OVERRIDE(OP): Performed by: HOSPITALIST

## 2019-01-16 PROCEDURE — 83690 ASSAY OF LIPASE: CPT

## 2019-01-16 PROCEDURE — 700102 HCHG RX REV CODE 250 W/ 637 OVERRIDE(OP): Performed by: INTERNAL MEDICINE

## 2019-01-16 RX ADMIN — OXYCODONE HYDROCHLORIDE 15 MG: 5 TABLET ORAL at 16:40

## 2019-01-16 RX ADMIN — ENOXAPARIN SODIUM 40 MG: 100 INJECTION SUBCUTANEOUS at 05:34

## 2019-01-16 RX ADMIN — OXYCODONE HYDROCHLORIDE 15 MG: 5 TABLET ORAL at 02:43

## 2019-01-16 RX ADMIN — ACETAMINOPHEN 1000 MG: 500 TABLET, FILM COATED ORAL at 00:56

## 2019-01-16 RX ADMIN — OXYCODONE HYDROCHLORIDE 15 MG: 5 TABLET ORAL at 20:32

## 2019-01-16 RX ADMIN — DULOXETINE 60 MG: 30 CAPSULE, DELAYED RELEASE ORAL at 17:31

## 2019-01-16 RX ADMIN — OXYCODONE HYDROCHLORIDE 15 MG: 5 TABLET ORAL at 10:20

## 2019-01-16 RX ADMIN — LORAZEPAM 1 MG: 1 TABLET ORAL at 02:43

## 2019-01-16 RX ADMIN — LORAZEPAM 1 MG: 1 TABLET ORAL at 20:40

## 2019-01-16 RX ADMIN — STANDARDIZED SENNA CONCENTRATE AND DOCUSATE SODIUM 2 TABLET: 8.6; 5 TABLET, FILM COATED ORAL at 05:34

## 2019-01-16 RX ADMIN — STANDARDIZED SENNA CONCENTRATE AND DOCUSATE SODIUM 2 TABLET: 8.6; 5 TABLET, FILM COATED ORAL at 17:31

## 2019-01-16 RX ADMIN — DULOXETINE 60 MG: 30 CAPSULE, DELAYED RELEASE ORAL at 05:34

## 2019-01-16 RX ADMIN — BUSPIRONE HYDROCHLORIDE 7.5 MG: 5 TABLET ORAL at 17:31

## 2019-01-16 RX ADMIN — BUSPIRONE HYDROCHLORIDE 7.5 MG: 5 TABLET ORAL at 05:34

## 2019-01-16 RX ADMIN — ACETAMINOPHEN 1000 MG: 500 TABLET, FILM COATED ORAL at 10:20

## 2019-01-16 RX ADMIN — ACETAMINOPHEN 1000 MG: 500 TABLET, FILM COATED ORAL at 18:00

## 2019-01-16 ASSESSMENT — ENCOUNTER SYMPTOMS
VOMITING: 0
DIZZINESS: 0
DIARRHEA: 0
COUGH: 0
NAUSEA: 0
SINUS PAIN: 0
BACK PAIN: 1
CONSTIPATION: 0
NAUSEA: 1
SPUTUM PRODUCTION: 0
HEADACHES: 0
CHILLS: 0
PALPITATIONS: 0
NERVOUS/ANXIOUS: 1
SORE THROAT: 0
ABDOMINAL PAIN: 1
WEAKNESS: 0
SHORTNESS OF BREATH: 0
FEVER: 0

## 2019-01-16 ASSESSMENT — PAIN SCALES - GENERAL
PAINLEVEL_OUTOF10: 6
PAINLEVEL_OUTOF10: 6
PAINLEVEL_OUTOF10: 7
PAINLEVEL_OUTOF10: 5
PAINLEVEL_OUTOF10: 7
PAINLEVEL_OUTOF10: 7
PAINLEVEL_OUTOF10: 2

## 2019-01-16 NOTE — PROGRESS NOTES
"Hospital Medicine Daily Progress Note    Date of Service  1/15/2019    Chief Complaint  Abdominal pain    Hospital Course    *63-year-old female with history of chronic pain, depression presents with right upper quadrant pain radiating to her chest and between her shoulder blades she had significant LFT elevation however subsequent MRCP did not reveal any obstructing stones.  She was taken to the OR on 1/14 by Dr. Mayen-underwent lap jef, the following morning LFTs unfortunately were worse.*      Interval Problem Update  Patient restless, agitated complains of \"cramping in all of her muscles\"pain all over.  Her agitation has worsened throughout the day.  Seen with RN.  LFTs are increased this morning therefore she cannot safely be discharged, she has had a bowel movement, is ambulatory and is tolerating diet.  Lost IV access but no longer requires IV medications    Consultants/Specialty  General surgery    Code Status  Full    Disposition  Home    Review of Systems  Review of Systems   Constitutional: Negative for malaise/fatigue.        She is cold and covered in blankets   HENT: Negative for sinus pain and sore throat.    Respiratory: Positive for cough and shortness of breath. Negative for sputum production.    Cardiovascular: Negative for chest pain and palpitations.   Gastrointestinal: Positive for abdominal pain. Negative for constipation, diarrhea, nausea and vomiting.   Genitourinary: Negative for dysuria and urgency.   Musculoskeletal: Positive for back pain and joint pain.        Says has cramps all over   Skin: Negative for itching and rash.   Neurological: Negative for dizziness, weakness and headaches.   Psychiatric/Behavioral: The patient is nervous/anxious.         Physical Exam  Temp:  [36.7 °C (98 °F)-37.2 °C (99 °F)] 36.8 °C (98.2 °F)  Pulse:  [] 115  Resp:  [18] 18  BP: (102-134)/(56-82) 118/59  SpO2:  [92 %-95 %] 92 %    Physical Exam   Constitutional: She is oriented to person, " place, and time. She appears well-developed and well-nourished. She appears distressed (Anxious, restless).   HENT:   Head: Normocephalic and atraumatic.   Right Ear: External ear normal.   Left Ear: External ear normal.   Nose: Nose normal.   Eyes: Pupils are equal, round, and reactive to light. Conjunctivae and EOM are normal. Right eye exhibits no discharge. Left eye exhibits no discharge. No scleral icterus.   Neck: Neck supple.   Cardiovascular: Normal rate and regular rhythm.    Pulmonary/Chest: Effort normal. No respiratory distress. She has no wheezes. She has rales.   Crackles right base take several coughs to clear   Abdominal: Soft. Bowel sounds are normal. She exhibits no distension. There is tenderness (Normal postop).   Musculoskeletal: She exhibits no edema or tenderness.   Her muscles are all soft without any evidence of spasticity or fasciculations including her lumbar spine   Neurological: She is alert and oriented to person, place, and time. No cranial nerve deficit.   Skin: Skin is warm and dry. She is not diaphoretic.   Reticularis pattern   Psychiatric:   Anxious, restless   Nursing note and vitals reviewed.      Fluids    Intake/Output Summary (Last 24 hours) at 01/15/19 1815  Last data filed at 01/15/19 0900   Gross per 24 hour   Intake             1740 ml   Output                0 ml   Net             1740 ml       Laboratory  Recent Labs      01/13/19   0923  01/14/19   0449  01/15/19   0443   WBC  6.3  10.0  8.2   RBC  4.59  4.49  3.47*   HEMOGLOBIN  14.1  13.7  10.6*   HEMATOCRIT  40.3  41.6  32.3*   MCV  87.8  92.7  93.1   MCH  30.7  30.5  30.5   MCHC  35.0  32.9*  32.8*   RDW  39.7  43.2  43.4   PLATELETCT  227  206  146*   MPV  9.7  10.3  10.3     Recent Labs      01/13/19   0923  01/14/19   0449  01/15/19   0443   SODIUM  136  139  138   POTASSIUM  4.0  3.6  3.7   CHLORIDE  105  104  105   CO2  20  28  28   GLUCOSE  143*  122*  112*   BUN  20  15  9   CREATININE  0.70  0.71  0.69    CALCIUM  9.1  8.3*  7.8*     Recent Labs      01/13/19   0923   APTT  22.3*   INR  0.95     Recent Labs      01/13/19   0923   BNPBTYPENAT  15           Imaging  [id number]   Final Result         1. No choledocholithiasis.   2. Unchanged 10 mm dilatation of the CBD, nonspecific. No pancreatic ductal dilatation.   3. Cholelithiasis. No evidence of cholecystitis.   4. Colonic diverticulosis.      CT-CTA COMPLETE THORACOABDOMINAL AORTA   Final Result      1.  No aortic aneurysm or dissection. No pulmonary embolus.   2.  Colonic diverticulosis.   3.  Mild circumferential mural thickening of the transverse and descending colon, likely related to underdistention or sequela of prior colitis. Acute colitis is less likely.         US-RUQ   Final Result         1. Cholelithiasis. No evidence of cholecystitis.   2. Dilated, 10 mm CBD. If there is clinical concern for choledocholithiasis, consider MRCP.   3. Increased hepatic echogenicity, suggestive of hepatic steatosis or hepatocellular disease.      DX-CHEST-PORTABLE (1 VIEW)   Final Result      No acute cardiopulmonary abnormality.           Assessment/Plan  Choledocholithiasis   Assessment & Plan    Past stone per MRCP however now LFTs are up  Status post lap jef  Follow-up labs tomorrow     Mood disorder (HCC)   Assessment & Plan    With significant anxiety  Continue Cymbalta, Zoloft, add Ativan during acute recovery     Elevated liver enzymes   Assessment & Plan    -Due to choledocholithiasis, stone had already passed prior to arrival, LFTs are increased postop  Continue to monitor     Chronic narcotic dependence (HCC)   Assessment & Plan    Patient is on more than twice her home dose of Percocet  She has an appointment with her pain specialist tomorrow at 845  We will try to discharge her as close to 7 AM as possible provided her LFTs are permitting  Resume her Flexeril today     Chest pain   Assessment & Plan    -Actual right upper quadrant pain, no ACS           VTE prophylaxis: Lovenox

## 2019-01-16 NOTE — PROGRESS NOTES
Progress Note               Author: Doris Dioptessa Date & Time created: 1/16/2019  9:29 AM     Interval History:  Significant epigastric pain and tenderness. Tolerating a small amount of her diet without nausea.     Review of Systems:  Review of Systems   Constitutional: Negative for chills and fever.   Gastrointestinal: Positive for abdominal pain. Negative for nausea and vomiting.       Physical Exam:  Physical Exam   Constitutional: She is oriented to person, place, and time. She appears well-developed and well-nourished. No distress.   HENT:   Head: Normocephalic and atraumatic.   Eyes: Conjunctivae are normal.   Neck: Normal range of motion.   Pulmonary/Chest: Effort normal.   Abdominal: Soft. She exhibits distension. There is tenderness. There is no rebound and no guarding.   Increased epigastric tenderness. Appropriate incisional tenderness.    Musculoskeletal: Normal range of motion.   Neurological: She is alert and oriented to person, place, and time.   Skin: Skin is warm and dry. No rash noted. She is not diaphoretic. No erythema.   Psychiatric: She has a normal mood and affect. Her behavior is normal.       Labs:      Recent Labs      01/13/19   1400  01/13/19   1758   TROPONINI  <0.02  <0.02     Recent Labs      01/14/19   0449  01/15/19   0443  01/16/19   0449   SODIUM  139  138  135   POTASSIUM  3.6  3.7  3.3*   CHLORIDE  104  105  101   CO2  28  28  27   BUN  15  9  11   CREATININE  0.71  0.69  0.84   CALCIUM  8.3*  7.8*  8.1*     Recent Labs      01/14/19   0449  01/15/19   0443  01/16/19   0449   ALTSGPT  117*  653*  437*   ASTSGOT  273*  640*  248*   ALKPHOSPHAT  106*  205*  209*   TBILIRUBIN  2.2*  2.6*  3.6*   LIPASE   --    --   >400*   GLUCOSE  122*  112*  100*     Recent Labs      01/14/19   0449  01/15/19   0443   RBC  4.49  3.47*   HEMOGLOBIN  13.7  10.6*   HEMATOCRIT  41.6  32.3*   PLATELETCT  206  146*     Recent Labs      01/14/19   0449  01/15/19   0443  01/16/19   0449   WBC  10.0   8.2   --    NEUTSPOLYS  86.70*   --    --    LYMPHOCYTES  5.80*   --    --    MONOCYTES  6.90   --    --    EOSINOPHILS  0.10   --    --    BASOPHILS  0.20   --    --    ASTSGOT  273*  640*  248*   ALTSGPT  117*  653*  437*   ALKPHOSPHAT  106*  205*  209*   TBILIRUBIN  2.2*  2.6*  3.6*     Hemodynamics:  Temp (24hrs), Av.8 °C (98.2 °F), Min:36.6 °C (97.8 °F), Max:37.1 °C (98.8 °F)  Temperature: 36.6 °C (97.8 °F)  Pulse  Av.3  Min: 60  Max: 121  Blood Pressure: 135/71     Respiratory:    Respiration: 18, Pulse Oximetry: 98 %        RUL Breath Sounds: Clear, RML Breath Sounds: Clear, RLL Breath Sounds: Diminished, CHRISTINA Breath Sounds: Clear, LLL Breath Sounds: Diminished  Fluids:    Intake/Output Summary (Last 24 hours) at 19 0929  Last data filed at 01/15/19 1453   Gross per 24 hour   Intake          1360.42 ml   Output                0 ml   Net          1360.42 ml     Weight: 75 kg (165 lb 5.5 oz)  GI/Nutrition:  Orders Placed This Encounter   Procedures   • Diet Order Clear Liquid     Standing Status:   Standing     Number of Occurrences:   1     Order Specific Question:   Diet:     Answer:   Clear Liquid [10]     Medical Decision Making, by Problem:  Active Hospital Problems    Diagnosis   • Choledocholithiasis [K80.50]   • Mood disorder (HCC) [F39]   • Elevated liver enzymes [R74.8]   • Chest pain [R07.9]   • Chronic narcotic dependence (HCC) [F11.20]       Plan:  Choledocholithiasis now with pancreatitis with lipase >400. No significant systemic affects at this time, no tachycardia, hypotension or elevation in creatinine. Advise IVF at 150cc/hr, clear liquid diet. Black coffee ok.   Transaminitis improving. Bilirubin slightly increased. If not improved tomorrow, will recheck MRCP as there may have been a retained stone after cholecystectomy.   Explained dangers of pancreatitis with patient with possible consequences of ICU stay and death if she isn't treated. She expressed understanding and agreed  for her IV to be replaced and that she would take treatment day by day. Discussed with RN Haroon, Dr. Barragan and patient's .     Quality-Core Measures   Reviewed items::  Medications reviewed  Christine catheter::  No Christine  DVT prophylaxis pharmacological::  Enoxaparin (Lovenox)  DVT prophylaxis - mechanical:  SCDs  Ulcer Prophylaxis::  Yes

## 2019-01-16 NOTE — PROGRESS NOTES
Pt post-jef. Chronic pain mngt may be interfering with current attempts post-sx. Pt also exhibits anxiety episodes, confirmed by . Anxiety seems to contribute to pain. Appt with pain mngt tomorrow, if able to DC. LFT trending for pending DC. Oxy and Toradol for pain, Ativan for anxiety. AOx4, but forgetful. Denies CP, SOB, and N&V, but C/O ABD pain. Bed low and locked, call bell in reach.

## 2019-01-16 NOTE — PROGRESS NOTES
Hospital Medicine Daily Progress Note    Date of Service  1/16/2019    Chief Complaint  Abdominal pain    Hospital Course    *63-year-old female with history of chronic pain, depression presents with right upper quadrant pain radiating to her chest and between her shoulder blades she had significant LFT elevation however subsequent MRCP did not reveal any obstructing stones.  She was taken to the OR on 1/14 by Dr. Mayen-underwent lap jef, the following morning LFTs unfortunately were worse.*      Interval Problem Update  Patient was calm and dressed and ready to leave this morning  Had not yet eaten breakfast, appetite iffy, but denied overt nausea.  Her epigastric area was more tender and I discussed her laboratory studies with her as well as with surgeon.  Dr. Mayen agreed that patient should not be discharged and requires treatment for pancreatitis and monitoring, she subsequently came in to evaluate the patient and patient was agreeable to remain in the hospital.  When patient tried to eat her symptoms worsened, when seen later in the afternoon her epigastric pain had improved and she was tolerating clears/    Consultants/Specialty  General surgery    Code Status  Full    Disposition  Home    Review of Systems  Review of Systems   Constitutional: Negative for malaise/fatigue.   HENT: Negative for sinus pain and sore throat.    Respiratory: Negative for cough, sputum production and shortness of breath.    Cardiovascular: Negative for chest pain and palpitations.   Gastrointestinal: Positive for abdominal pain and nausea. Negative for constipation, diarrhea and vomiting.   Genitourinary: Negative for dysuria and urgency.   Musculoskeletal: Positive for back pain (Baseline) and joint pain (Baseline).   Skin: Negative for itching and rash.   Neurological: Negative for dizziness, weakness and headaches.   Psychiatric/Behavioral: The patient is nervous/anxious (Improved compared to yesterday).          Physical Exam  Temp:  [36.6 °C (97.8 °F)-37.1 °C (98.8 °F)] 36.6 °C (97.8 °F)  Pulse:  [] 95  Resp:  [16-18] 16  BP: ()/(45-71) 105/52  SpO2:  [90 %-98 %] 98 %    Physical Exam   Constitutional: She is oriented to person, place, and time. She appears well-developed and well-nourished. No distress.   HENT:   Head: Normocephalic and atraumatic.   Right Ear: External ear normal.   Left Ear: External ear normal.   Nose: Nose normal.   Eyes: Pupils are equal, round, and reactive to light. Conjunctivae and EOM are normal. Right eye exhibits no discharge. Left eye exhibits no discharge. Scleral icterus is present.   Neck: Neck supple.   Cardiovascular: Normal rate and regular rhythm.    Pulmonary/Chest: Effort normal. No respiratory distress. She has no wheezes. She has no rales.   Abdominal: Soft. Bowel sounds are normal. She exhibits no distension. There is tenderness (Less tenderness near her incisions more tenderness in the epigastrium).   Musculoskeletal: She exhibits no edema or tenderness.   Neurological: She is alert and oriented to person, place, and time. No cranial nerve deficit.   Skin: Skin is warm and dry. She is not diaphoretic.   Psychiatric: She has a normal mood and affect.   Minimally anxious   Nursing note and vitals reviewed.      Fluids  No intake or output data in the 24 hours ending 01/16/19 1526    Laboratory  Recent Labs      01/14/19   0449  01/15/19   0443   WBC  10.0  8.2   RBC  4.49  3.47*   HEMOGLOBIN  13.7  10.6*   HEMATOCRIT  41.6  32.3*   MCV  92.7  93.1   MCH  30.5  30.5   MCHC  32.9*  32.8*   RDW  43.2  43.4   PLATELETCT  206  146*   MPV  10.3  10.3     Recent Labs      01/14/19   0449  01/15/19   0443  01/16/19   0449   SODIUM  139  138  135   POTASSIUM  3.6  3.7  3.3*   CHLORIDE  104  105  101   CO2  28  28  27   GLUCOSE  122*  112*  100*   BUN  15  9  11   CREATININE  0.71  0.69  0.84   CALCIUM  8.3*  7.8*  8.1*                   Imaging  [id number]   Final Result          1. No choledocholithiasis.   2. Unchanged 10 mm dilatation of the CBD, nonspecific. No pancreatic ductal dilatation.   3. Cholelithiasis. No evidence of cholecystitis.   4. Colonic diverticulosis.      CT-CTA COMPLETE THORACOABDOMINAL AORTA   Final Result      1.  No aortic aneurysm or dissection. No pulmonary embolus.   2.  Colonic diverticulosis.   3.  Mild circumferential mural thickening of the transverse and descending colon, likely related to underdistention or sequela of prior colitis. Acute colitis is less likely.         US-RUQ   Final Result         1. Cholelithiasis. No evidence of cholecystitis.   2. Dilated, 10 mm CBD. If there is clinical concern for choledocholithiasis, consider MRCP.   3. Increased hepatic echogenicity, suggestive of hepatic steatosis or hepatocellular disease.      DX-CHEST-PORTABLE (1 VIEW)   Final Result      No acute cardiopulmonary abnormality.           Assessment/Plan  Choledocholithiasis   Assessment & Plan    Past stone per MRCP  However postoperative LFTs were elevated including bili, alk phos  Follow-up labs today show improvement in transaminases but other parameters are elevated along with lipase  Continue to monitor  Discussed with surgeon if not improved by tomorrow will consult GI     Gallstone pancreatitis   Assessment & Plan    Suspected, continue to monitor laboratory studies  Reduce diet to clear liquids, IV fluids initiated.  Continue pain management     Mood disorder (HCC)   Assessment & Plan    With significant anxiety  Continue Cymbalta, Zoloft, add Ativan during acute recovery     Elevated liver enzymes   Assessment & Plan    -Due to choledocholithiasis, stone had already passed prior to arrival, all LFTs increased postop  Transaminases better today but bili and alk phos remain elevated-bili more so.  Lipase also elevated  Not stable for discharge  A.m. labs ordered     Chronic narcotic dependence (HCC)   Assessment & Plan    Patient's distress is  significantly improved today  Continue medications     Chest pain   Assessment & Plan    -Actual right upper quadrant pain, no ACS          VTE prophylaxis: Lovenox

## 2019-01-16 NOTE — CARE PLAN
Problem: Bowel/Gastric:  Goal: Normal bowel function is maintained or improved  Outcome: PROGRESSING SLOWER THAN EXPECTED  Pt has not had a BM during admit. Suggested Miralax in AM, if no BM overnight.    Problem: Pain Management  Goal: Pain level will decrease to patient's comfort goal  Outcome: PROGRESSING SLOWER THAN EXPECTED  Current pain management is effective for short periods, but seems exacerbated by anxious states.    Problem: Psychosocial Needs:  Goal: Level of anxiety will decrease  Outcome: PROGRESSING SLOWER THAN EXPECTED  Pt seem to cycle quickly back to an anxious state. Ativan should be used as often as PRN allows. Pt seems to lack any alternative methods of management.

## 2019-01-16 NOTE — CARE PLAN
Problem: Knowledge Deficit  Goal: Knowledge of disease process/condition, treatment plan, diagnostic tests, and medications will improve    Intervention: Assess knowledge level of disease process/condition, treatment plan, diagnostic tests, and medications  Educated about lab values and treatment for pancreas      Problem: Pain Management  Goal: Pain level will decrease to patient's comfort goal    Intervention: Follow pain managment plan developed in collaboration with patient and Interdisciplinary Team  Pt has been taking prns and able to sleep this afternoon; describes the pain as moderate and not spasms/stabbing.

## 2019-01-16 NOTE — PROGRESS NOTES
Tele Summary    Sinus Tachycardia/Sinus Rhythm  's    0.16 /0.10 / 0.34     Sinus Tachy approx 1 sec @ 12:50    Sinus Tach to SVT approx 1.8 secs @ 16:37    Pt asymptomatic    MD aware

## 2019-01-16 NOTE — CARE PLAN
Problem: Pain Management  Goal: Pain level will decrease to patient's comfort goal  Outcome: PROGRESSING SLOWER THAN EXPECTED  Pt requiring high doses of PRNs due to chronic pain and pain in abd from surgery.     Problem: Psychosocial Needs:  Goal: Level of anxiety will decrease  Outcome: PROGRESSING SLOWER THAN EXPECTED  Pt still needing Ativan Q 4 hrs for anxiety while being in the hospital.

## 2019-01-16 NOTE — PROGRESS NOTES
Report received from Elda about 0730, when going into the room pt was getting herself dressed and had her IV out.  Pt said that her  was on his way from [place] as she has an appointment at 0845 with her pain specialist.  Finished helping getting her dressed and spoke about discharge education for her lap jef.  When asking about pain she states its better than yesterday and she is just tender.  Pt report she is passing a little gas but mostly blenching and that she does have an appetite.  Spoke with Dr. Barragan at the nursing station about her going home and she contacted the surgeon, Dr. Mayen.  Dr. Barragan came to the bedside and spoke with the patient, going to wait for the surgeon to come by who said it would be about an hour.  Pt ate about 25% of her breakfast with no increase in pain.

## 2019-01-17 LAB
ALBUMIN SERPL BCP-MCNC: 2.2 G/DL (ref 3.2–4.9)
ALBUMIN/GLOB SERPL: 1 G/DL
ALP SERPL-CCNC: 180 U/L (ref 30–99)
ALT SERPL-CCNC: 258 U/L (ref 2–50)
ANION GAP SERPL CALC-SCNC: 10 MMOL/L (ref 0–11.9)
AST SERPL-CCNC: 96 U/L (ref 12–45)
BASOPHILS # BLD AUTO: 0.3 % (ref 0–1.8)
BASOPHILS # BLD: 0.01 K/UL (ref 0–0.12)
BILIRUB SERPL-MCNC: 1.6 MG/DL (ref 0.1–1.5)
BUN SERPL-MCNC: 7 MG/DL (ref 8–22)
CALCIUM SERPL-MCNC: 7.4 MG/DL (ref 8.4–10.2)
CHLORIDE SERPL-SCNC: 99 MMOL/L (ref 96–112)
CO2 SERPL-SCNC: 26 MMOL/L (ref 20–33)
CREAT SERPL-MCNC: 0.61 MG/DL (ref 0.5–1.4)
EOSINOPHIL # BLD AUTO: 0.15 K/UL (ref 0–0.51)
EOSINOPHIL NFR BLD: 4.4 % (ref 0–6.9)
ERYTHROCYTE [DISTWIDTH] IN BLOOD BY AUTOMATED COUNT: 43.8 FL (ref 35.9–50)
GLOBULIN SER CALC-MCNC: 2.3 G/DL (ref 1.9–3.5)
GLUCOSE SERPL-MCNC: 86 MG/DL (ref 65–99)
HCT VFR BLD AUTO: 29.9 % (ref 37–47)
HGB BLD-MCNC: 9.7 G/DL (ref 12–16)
IMM GRANULOCYTES # BLD AUTO: 0.01 K/UL (ref 0–0.11)
IMM GRANULOCYTES NFR BLD AUTO: 0.3 % (ref 0–0.9)
LIPASE SERPL-CCNC: 142 U/L (ref 7–58)
LYMPHOCYTES # BLD AUTO: 0.43 K/UL (ref 1–4.8)
LYMPHOCYTES NFR BLD: 12.5 % (ref 22–41)
MCH RBC QN AUTO: 30.2 PG (ref 27–33)
MCHC RBC AUTO-ENTMCNC: 32.4 G/DL (ref 33.6–35)
MCV RBC AUTO: 93.1 FL (ref 81.4–97.8)
MONOCYTES # BLD AUTO: 0.34 K/UL (ref 0–0.85)
MONOCYTES NFR BLD AUTO: 9.9 % (ref 0–13.4)
NEUTROPHILS # BLD AUTO: 2.5 K/UL (ref 2–7.15)
NEUTROPHILS NFR BLD: 72.6 % (ref 44–72)
NRBC # BLD AUTO: 0 K/UL
NRBC BLD-RTO: 0 /100 WBC
PLATELET # BLD AUTO: 134 K/UL (ref 164–446)
PMV BLD AUTO: 10.4 FL (ref 9–12.9)
POTASSIUM SERPL-SCNC: 3 MMOL/L (ref 3.6–5.5)
PROT SERPL-MCNC: 4.5 G/DL (ref 6–8.2)
RBC # BLD AUTO: 3.21 M/UL (ref 4.2–5.4)
SODIUM SERPL-SCNC: 135 MMOL/L (ref 135–145)
WBC # BLD AUTO: 3.4 K/UL (ref 4.8–10.8)

## 2019-01-17 PROCEDURE — A9270 NON-COVERED ITEM OR SERVICE: HCPCS | Performed by: SURGERY

## 2019-01-17 PROCEDURE — 83690 ASSAY OF LIPASE: CPT

## 2019-01-17 PROCEDURE — 80053 COMPREHEN METABOLIC PANEL: CPT

## 2019-01-17 PROCEDURE — 85025 COMPLETE CBC W/AUTO DIFF WBC: CPT

## 2019-01-17 PROCEDURE — A9270 NON-COVERED ITEM OR SERVICE: HCPCS | Performed by: HOSPITALIST

## 2019-01-17 PROCEDURE — 700111 HCHG RX REV CODE 636 W/ 250 OVERRIDE (IP): Performed by: INTERNAL MEDICINE

## 2019-01-17 PROCEDURE — 99232 SBSQ HOSP IP/OBS MODERATE 35: CPT | Performed by: INTERNAL MEDICINE

## 2019-01-17 PROCEDURE — 700105 HCHG RX REV CODE 258: Performed by: SURGERY

## 2019-01-17 PROCEDURE — A9270 NON-COVERED ITEM OR SERVICE: HCPCS | Performed by: INTERNAL MEDICINE

## 2019-01-17 PROCEDURE — 700102 HCHG RX REV CODE 250 W/ 637 OVERRIDE(OP): Performed by: HOSPITALIST

## 2019-01-17 PROCEDURE — 700102 HCHG RX REV CODE 250 W/ 637 OVERRIDE(OP): Performed by: SURGERY

## 2019-01-17 PROCEDURE — 770006 HCHG ROOM/CARE - MED/SURG/GYN SEMI*

## 2019-01-17 PROCEDURE — 700102 HCHG RX REV CODE 250 W/ 637 OVERRIDE(OP): Performed by: INTERNAL MEDICINE

## 2019-01-17 RX ORDER — SODIUM CHLORIDE, SODIUM LACTATE, POTASSIUM CHLORIDE, CALCIUM CHLORIDE 600; 310; 30; 20 MG/100ML; MG/100ML; MG/100ML; MG/100ML
1000 INJECTION, SOLUTION INTRAVENOUS CONTINUOUS
Status: DISCONTINUED | OUTPATIENT
Start: 2019-01-17 | End: 2019-01-18

## 2019-01-17 RX ADMIN — BUSPIRONE HYDROCHLORIDE 7.5 MG: 5 TABLET ORAL at 05:18

## 2019-01-17 RX ADMIN — DULOXETINE 60 MG: 30 CAPSULE, DELAYED RELEASE ORAL at 05:18

## 2019-01-17 RX ADMIN — ACETAMINOPHEN 1000 MG: 500 TABLET, FILM COATED ORAL at 10:12

## 2019-01-17 RX ADMIN — ACETAMINOPHEN 1000 MG: 500 TABLET, FILM COATED ORAL at 16:04

## 2019-01-17 RX ADMIN — BUSPIRONE HYDROCHLORIDE 7.5 MG: 5 TABLET ORAL at 18:03

## 2019-01-17 RX ADMIN — CYCLOBENZAPRINE HYDROCHLORIDE 10 MG: 10 TABLET, FILM COATED ORAL at 18:06

## 2019-01-17 RX ADMIN — OXYCODONE HYDROCHLORIDE 15 MG: 5 TABLET ORAL at 10:12

## 2019-01-17 RX ADMIN — STANDARDIZED SENNA CONCENTRATE AND DOCUSATE SODIUM 2 TABLET: 8.6; 5 TABLET, FILM COATED ORAL at 18:02

## 2019-01-17 RX ADMIN — STANDARDIZED SENNA CONCENTRATE AND DOCUSATE SODIUM 2 TABLET: 8.6; 5 TABLET, FILM COATED ORAL at 05:18

## 2019-01-17 RX ADMIN — SODIUM CHLORIDE, POTASSIUM CHLORIDE, SODIUM LACTATE AND CALCIUM CHLORIDE 1000 ML: 600; 310; 30; 20 INJECTION, SOLUTION INTRAVENOUS at 10:11

## 2019-01-17 RX ADMIN — OXYCODONE HYDROCHLORIDE 15 MG: 5 TABLET ORAL at 20:41

## 2019-01-17 RX ADMIN — LORAZEPAM 1 MG: 1 TABLET ORAL at 21:36

## 2019-01-17 RX ADMIN — OXYCODONE HYDROCHLORIDE 15 MG: 5 TABLET ORAL at 00:46

## 2019-01-17 RX ADMIN — ENOXAPARIN SODIUM 40 MG: 100 INJECTION SUBCUTANEOUS at 05:17

## 2019-01-17 RX ADMIN — OXYCODONE HYDROCHLORIDE 15 MG: 5 TABLET ORAL at 16:03

## 2019-01-17 RX ADMIN — ACETAMINOPHEN 1000 MG: 500 TABLET, FILM COATED ORAL at 00:12

## 2019-01-17 RX ADMIN — OXYCODONE HYDROCHLORIDE 15 MG: 5 TABLET ORAL at 05:18

## 2019-01-17 RX ADMIN — DULOXETINE 60 MG: 30 CAPSULE, DELAYED RELEASE ORAL at 18:02

## 2019-01-17 ASSESSMENT — ENCOUNTER SYMPTOMS
SHORTNESS OF BREATH: 0
SORE THROAT: 0
SPUTUM PRODUCTION: 0
BACK PAIN: 1
DIARRHEA: 0
DIZZINESS: 0
VOMITING: 0
SINUS PAIN: 0
CHILLS: 0
NERVOUS/ANXIOUS: 1
ABDOMINAL PAIN: 1
NAUSEA: 0
NAUSEA: 1
WEAKNESS: 0
COUGH: 0
PALPITATIONS: 0
CONSTIPATION: 0
HEADACHES: 0
FEVER: 0

## 2019-01-17 ASSESSMENT — PAIN SCALES - GENERAL
PAINLEVEL_OUTOF10: 7
PAINLEVEL_OUTOF10: 6
PAINLEVEL_OUTOF10: 8

## 2019-01-17 NOTE — PROGRESS NOTES
Hospital Medicine Daily Progress Note    Date of Service  1/17/2019    Chief Complaint  Abdominal pain    Hospital Course    *63-year-old female with history of chronic pain, depression presents with right upper quadrant pain radiating to her chest and between her shoulder blades she had significant LFT elevation however subsequent MRCP did not reveal any obstructing stones.  She was taken to the OR on 1/14 by Dr. Mayen-underwent lap jef, the following morning LFTs unfortunately were worse.*      Interval Problem Update  OK'd for fulls tonigt by surgeon, however the full liquid diet tends to be high fat so discussed BRAT w/ patient and RN.  Patient's pain~ same and appetite minimal  Seen w/ RN, discussed POC at bedside    Consultants/Specialty  General surgery    Code Status  Full    Disposition  Home    Review of Systems  Review of Systems   Constitutional: Negative for malaise/fatigue.   HENT: Negative for sinus pain and sore throat.    Respiratory: Negative for cough, sputum production and shortness of breath.    Cardiovascular: Negative for chest pain and palpitations.   Gastrointestinal: Positive for abdominal pain (same) and nausea (mild). Negative for constipation, diarrhea and vomiting.        Marginal appetite   Genitourinary: Negative for dysuria and urgency.   Musculoskeletal: Positive for back pain (Baseline) and joint pain (Baseline).   Skin: Negative for itching and rash.   Neurological: Negative for dizziness, weakness and headaches.   Psychiatric/Behavioral: The patient is nervous/anxious (stable).         Physical Exam  Temp:  [36.3 °C (97.4 °F)-37.1 °C (98.7 °F)] 36.7 °C (98 °F)  Pulse:  [] 87  Resp:  [16] 16  BP: (105-149)/(52-83) 126/83  SpO2:  [92 %-94 %] 94 %    Physical Exam   Constitutional: She is oriented to person, place, and time. She appears well-developed and well-nourished. No distress.   HENT:   Head: Normocephalic and atraumatic.   Right Ear: External ear normal.   Left  Ear: External ear normal.   Nose: Nose normal.   Eyes: Pupils are equal, round, and reactive to light. Conjunctivae and EOM are normal. Right eye exhibits no discharge. Left eye exhibits no discharge.   Neck: Neck supple.   Cardiovascular: Normal rate and regular rhythm.    Pulmonary/Chest: Effort normal. No respiratory distress. She has no wheezes. She has no rales.   Abdominal: Soft. Bowel sounds are normal. She exhibits no distension. There is tenderness (more generalized acorss epigastricum). There is no rebound and no guarding.   Musculoskeletal: She exhibits no edema or tenderness.   Neurological: She is alert and oriented to person, place, and time. No cranial nerve deficit.   Skin: Skin is warm and dry. She is not diaphoretic.   Psychiatric: She has a normal mood and affect.    anxious   Nursing note and vitals reviewed.      Fluids    Intake/Output Summary (Last 24 hours) at 01/17/19 1113  Last data filed at 01/17/19 0809   Gross per 24 hour   Intake             3500 ml   Output                0 ml   Net             3500 ml       Laboratory  Recent Labs      01/15/19   0443  01/17/19   0408   WBC  8.2  3.4*   RBC  3.47*  3.21*   HEMOGLOBIN  10.6*  9.7*   HEMATOCRIT  32.3*  29.9*   MCV  93.1  93.1   MCH  30.5  30.2   MCHC  32.8*  32.4*   RDW  43.4  43.8   PLATELETCT  146*  134*   MPV  10.3  10.4     Recent Labs      01/15/19   0443  01/16/19   0449  01/17/19   0408   SODIUM  138  135  135   POTASSIUM  3.7  3.3*  3.0*   CHLORIDE  105  101  99   CO2  28  27  26   GLUCOSE  112*  100*  86   BUN  9  11  7*   CREATININE  0.69  0.84  0.61   CALCIUM  7.8*  8.1*  7.4*                   Imaging  [id number]   Final Result         1. No choledocholithiasis.   2. Unchanged 10 mm dilatation of the CBD, nonspecific. No pancreatic ductal dilatation.   3. Cholelithiasis. No evidence of cholecystitis.   4. Colonic diverticulosis.      CT-CTA COMPLETE THORACOABDOMINAL AORTA   Final Result      1.  No aortic aneurysm or  dissection. No pulmonary embolus.   2.  Colonic diverticulosis.   3.  Mild circumferential mural thickening of the transverse and descending colon, likely related to underdistention or sequela of prior colitis. Acute colitis is less likely.         US-RUQ   Final Result         1. Cholelithiasis. No evidence of cholecystitis.   2. Dilated, 10 mm CBD. If there is clinical concern for choledocholithiasis, consider MRCP.   3. Increased hepatic echogenicity, suggestive of hepatic steatosis or hepatocellular disease.      DX-CHEST-PORTABLE (1 VIEW)   Final Result      No acute cardiopulmonary abnormality.           Assessment/Plan  Choledocholithiasis   Assessment & Plan    Past stone per MRCP  However postoperative LFTs were elevated including bili, alk phos  Then lipase up and epigastric pain>      Gallstone pancreatitis   Assessment & Plan    Lipase decreased, still marginal appetite and tenderness  Advance to BRAT to assure low/minimal fat, continue to monitor labs, continue IVF     Mood disorder (HCC)   Assessment & Plan    With significant anxiety  Continue Cymbalta, Zoloft, add Ativan during acute recovery     Elevated liver enzymes   Assessment & Plan    -Due to choledocholithiasis, stone had already passed prior to arrival, all LFTs increased postop  LFT now much improved     Chronic narcotic dependence (HCC)   Assessment & Plan    Patient's distress is significantly improved today  Continue medications     Chest pain   Assessment & Plan    -Actual right upper quadrant pain, no ACS          VTE prophylaxis: Lovenox

## 2019-01-17 NOTE — PROGRESS NOTES
Bedside report received from CHIP Patiño. Pt resting in bed with safety precautions in place. Pt requesting pain meds soon. Needs addressed, all questions answered at this time.

## 2019-01-17 NOTE — PROGRESS NOTES
Dr. Mayen at bedside about 0920 and spoke to the patient as well as the  who was arriving.  IV restarted and IVF started.  Diet downgraded to clear liquids.  Medicated for pain and given a warm blanket.  Pt able to sleep for about 2 hours until she woke about 1330 and had an incontient episode of urine.  Pt showered independently and linens changed.  Tele placed back on after clarifying with Dr. Barragan and pt able to rest until she requested pain medications.      Tele strip at 0714 shows SR at 89.      Measurements from am strip were as follows:  MS=0.14  QRS=0.08  QT=0.30    Tele Shift Summary:    Rhythm : SR  Rate : 80-100s  Ectopy : Per CCT Yas, pt had no ectopy.     Telemetry monitoring strips placed in pt's chart.

## 2019-01-17 NOTE — PROGRESS NOTES
Telemetry Shift Summary    Patient experienced 3.5 seconds of PSVT with a rate in the 160s. BP stable. Asymptomatic.    Rhythm SR/ST  HR Range 70s-100s  Ectopy Rare PVCs & PACs; PSVT  Measurements .14/.10/.34             Normal Values  Rhythm SR  HR Range    Measurements 0.12-0.20 / 0.06-0.10  / 0.30-0.52

## 2019-01-17 NOTE — PROGRESS NOTES
"Progress Note               Author: Doris Dioptessa Date & Time created: 1/17/2019  8:05 AM     Interval History:  \"I feel better today.\" Still having pain and soreness, mostly when moving around, but feels that it has improved. No N/V, tolerating clears without increased pain.     Review of Systems:  Review of Systems   Constitutional: Negative for chills and fever.   Gastrointestinal: Positive for abdominal pain. Negative for nausea and vomiting.       Physical Exam:  Physical Exam   Constitutional: She is oriented to person, place, and time. She appears well-developed and well-nourished. No distress.   HENT:   Head: Normocephalic and atraumatic.   Eyes: Conjunctivae are normal.   Neck: Normal range of motion.   Cardiovascular: Normal rate and regular rhythm.    Pulmonary/Chest: Effort normal.   Abdominal: Soft. She exhibits distension. There is tenderness.   Minimal distention, much improved from yesterday. Continues to have mild to moderate tenderness in the epigastrium and just below the umbilicus. Also significantly improved from yesterday.    Musculoskeletal: Normal range of motion.   Neurological: She is alert and oriented to person, place, and time.   Skin: Skin is warm and dry. She is not diaphoretic.   Psychiatric: She has a normal mood and affect. Her behavior is normal.       Labs:          Recent Labs      01/15/19   0443  01/16/19   0449  01/17/19   0408   SODIUM  138  135  135   POTASSIUM  3.7  3.3*  3.0*   CHLORIDE  105  101  99   CO2  28  27  26   BUN  9  11  7*   CREATININE  0.69  0.84  0.61   CALCIUM  7.8*  8.1*  7.4*     Recent Labs      01/15/19   0443  01/16/19   0449  01/17/19   0408   ALTSGPT  653*  437*  258*   ASTSGOT  640*  248*  96*   ALKPHOSPHAT  205*  209*  180*   TBILIRUBIN  2.6*  3.6*  1.6*   LIPASE   --   >400*  142*   GLUCOSE  112*  100*  86     Recent Labs      01/15/19   0443  01/17/19   0408   RBC  3.47*  3.21*   HEMOGLOBIN  10.6*  9.7*   HEMATOCRIT  32.3*  29.9* "   PLATELETCT  146*  134*     Recent Labs      01/15/19   0443  19   0449  19   0408   WBC  8.2   --   3.4*   NEUTSPOLYS   --    --   72.60*   LYMPHOCYTES   --    --   12.50*   MONOCYTES   --    --   9.90   EOSINOPHILS   --    --   4.40   BASOPHILS   --    --   0.30   ASTSGOT  640*  248*  96*   ALTSGPT  653*  437*  258*   ALKPHOSPHAT  205*  209*  180*   TBILIRUBIN  2.6*  3.6*  1.6*     Hemodynamics:  Temp (24hrs), Av.6 °C (97.9 °F), Min:36.3 °C (97.4 °F), Max:37.1 °C (98.7 °F)  Temperature: 36.3 °C (97.4 °F)  Pulse  Av.7  Min: 60  Max: 121  Blood Pressure: 149/69     Respiratory:    Respiration: 16, Pulse Oximetry: 92 %     Work Of Breathing / Effort: Mild  RUL Breath Sounds: Clear, RML Breath Sounds: Clear, RLL Breath Sounds: Diminished, CHRISTINA Breath Sounds: Clear, LLL Breath Sounds: Diminished  Fluids:    Intake/Output Summary (Last 24 hours) at 19 0805  Last data filed at 19 0600   Gross per 24 hour   Intake             3020 ml   Output                0 ml   Net             3020 ml     Weight: 73.8 kg (162 lb 11.2 oz)  GI/Nutrition:  Orders Placed This Encounter   Procedures   • Diet Order Clear Liquid     Standing Status:   Standing     Number of Occurrences:   1     Order Specific Question:   Diet:     Answer:   Clear Liquid [10]     Medical Decision Making, by Problem:  Active Hospital Problems    Diagnosis   • Choledocholithiasis [K80.50]   • Gallstone pancreatitis [K85.10]   • Mood disorder (HCC) [F39]   • Elevated liver enzymes [R74.8]   • Chest pain [R07.9]   • Chronic narcotic dependence (HCC) [F11.20]       Plan:  Discussed with patient that her LFTs/bilirubin and lipase are all trending towards normal, but not quite normal yet. She is still having some epigastric pain along with the elevated lipase, so she still does have clinical pancreatitis though no systemic effect (no tachycardia, hypotension, leukocytosis). I encouraged her to stay one more day to make sure the labs  continue to resolve and that she doesn't feel worse when taking a little more challenging diet.   Will plan to advance to full liquids for dinner if she continues to feel better through the day. Recheck labs tomorrow.   Decrease IVF to 75cc/hr.     Quality-Core Measures

## 2019-01-17 NOTE — CARE PLAN
Problem: Safety  Goal: Will remain free from injury  Outcome: PROGRESSING AS EXPECTED  Applied non-slip footwear. Educated to call before exiting bed.     Problem: Pain Management  Goal: Pain level will decrease to patient's comfort goal  Outcome: PROGRESSING AS EXPECTED  Administering pain medications PRN, see MAR. Pt appropriately using 0-10 scale.

## 2019-01-18 ENCOUNTER — APPOINTMENT (OUTPATIENT)
Dept: RADIOLOGY | Facility: MEDICAL CENTER | Age: 64
DRG: 417 | End: 2019-01-18
Attending: INTERNAL MEDICINE
Payer: COMMERCIAL

## 2019-01-18 LAB
ALBUMIN SERPL BCP-MCNC: 2.3 G/DL (ref 3.2–4.9)
ALBUMIN/GLOB SERPL: 0.8 G/DL
ALP SERPL-CCNC: 277 U/L (ref 30–99)
ALT SERPL-CCNC: 223 U/L (ref 2–50)
ANION GAP SERPL CALC-SCNC: 8 MMOL/L (ref 0–11.9)
AST SERPL-CCNC: 117 U/L (ref 12–45)
BASOPHILS # BLD AUTO: 0.4 % (ref 0–1.8)
BASOPHILS # BLD: 0.02 K/UL (ref 0–0.12)
BILIRUB SERPL-MCNC: 3.9 MG/DL (ref 0.1–1.5)
BUN SERPL-MCNC: <5 MG/DL (ref 8–22)
CALCIUM SERPL-MCNC: 7.7 MG/DL (ref 8.4–10.2)
CHLORIDE SERPL-SCNC: 98 MMOL/L (ref 96–112)
CO2 SERPL-SCNC: 26 MMOL/L (ref 20–33)
CREAT SERPL-MCNC: 0.57 MG/DL (ref 0.5–1.4)
EOSINOPHIL # BLD AUTO: 0.06 K/UL (ref 0–0.51)
EOSINOPHIL NFR BLD: 1.1 % (ref 0–6.9)
ERYTHROCYTE [DISTWIDTH] IN BLOOD BY AUTOMATED COUNT: 40.3 FL (ref 35.9–50)
GLOBULIN SER CALC-MCNC: 2.8 G/DL (ref 1.9–3.5)
GLUCOSE SERPL-MCNC: 85 MG/DL (ref 65–99)
HCT VFR BLD AUTO: 31.2 % (ref 37–47)
HGB BLD-MCNC: 10.7 G/DL (ref 12–16)
IMM GRANULOCYTES # BLD AUTO: 0.02 K/UL (ref 0–0.11)
IMM GRANULOCYTES NFR BLD AUTO: 0.4 % (ref 0–0.9)
LIPASE SERPL-CCNC: 57 U/L (ref 7–58)
LYMPHOCYTES # BLD AUTO: 0.41 K/UL (ref 1–4.8)
LYMPHOCYTES NFR BLD: 7.4 % (ref 22–41)
MCH RBC QN AUTO: 30.4 PG (ref 27–33)
MCHC RBC AUTO-ENTMCNC: 34.3 G/DL (ref 33.6–35)
MCV RBC AUTO: 88.6 FL (ref 81.4–97.8)
MONOCYTES # BLD AUTO: 0.56 K/UL (ref 0–0.85)
MONOCYTES NFR BLD AUTO: 10.2 % (ref 0–13.4)
NEUTROPHILS # BLD AUTO: 4.44 K/UL (ref 2–7.15)
NEUTROPHILS NFR BLD: 80.5 % (ref 44–72)
NRBC # BLD AUTO: 0 K/UL
NRBC BLD-RTO: 0 /100 WBC
PLATELET # BLD AUTO: 194 K/UL (ref 164–446)
PMV BLD AUTO: 10 FL (ref 9–12.9)
POTASSIUM SERPL-SCNC: 2.8 MMOL/L (ref 3.6–5.5)
PROT SERPL-MCNC: 5.1 G/DL (ref 6–8.2)
RBC # BLD AUTO: 3.52 M/UL (ref 4.2–5.4)
SODIUM SERPL-SCNC: 132 MMOL/L (ref 135–145)
WBC # BLD AUTO: 5.5 K/UL (ref 4.8–10.8)

## 2019-01-18 PROCEDURE — 83690 ASSAY OF LIPASE: CPT

## 2019-01-18 PROCEDURE — 74018 RADEX ABDOMEN 1 VIEW: CPT

## 2019-01-18 PROCEDURE — A9270 NON-COVERED ITEM OR SERVICE: HCPCS | Performed by: HOSPITALIST

## 2019-01-18 PROCEDURE — 700101 HCHG RX REV CODE 250: Performed by: INTERNAL MEDICINE

## 2019-01-18 PROCEDURE — 80053 COMPREHEN METABOLIC PANEL: CPT

## 2019-01-18 PROCEDURE — 700111 HCHG RX REV CODE 636 W/ 250 OVERRIDE (IP): Performed by: INTERNAL MEDICINE

## 2019-01-18 PROCEDURE — 770006 HCHG ROOM/CARE - MED/SURG/GYN SEMI*

## 2019-01-18 PROCEDURE — 85025 COMPLETE CBC W/AUTO DIFF WBC: CPT

## 2019-01-18 PROCEDURE — 700102 HCHG RX REV CODE 250 W/ 637 OVERRIDE(OP): Performed by: HOSPITALIST

## 2019-01-18 PROCEDURE — A9270 NON-COVERED ITEM OR SERVICE: HCPCS | Performed by: SURGERY

## 2019-01-18 PROCEDURE — 700102 HCHG RX REV CODE 250 W/ 637 OVERRIDE(OP): Performed by: SURGERY

## 2019-01-18 PROCEDURE — 99232 SBSQ HOSP IP/OBS MODERATE 35: CPT | Performed by: INTERNAL MEDICINE

## 2019-01-18 PROCEDURE — 700102 HCHG RX REV CODE 250 W/ 637 OVERRIDE(OP): Performed by: INTERNAL MEDICINE

## 2019-01-18 PROCEDURE — A9270 NON-COVERED ITEM OR SERVICE: HCPCS | Performed by: INTERNAL MEDICINE

## 2019-01-18 RX ORDER — SODIUM CHLORIDE AND POTASSIUM CHLORIDE 150; 900 MG/100ML; MG/100ML
INJECTION, SOLUTION INTRAVENOUS CONTINUOUS
Status: DISCONTINUED | OUTPATIENT
Start: 2019-01-18 | End: 2019-01-19

## 2019-01-18 RX ORDER — OXYCODONE HYDROCHLORIDE 10 MG/1
20 TABLET ORAL
Status: DISCONTINUED | OUTPATIENT
Start: 2019-01-18 | End: 2019-01-20 | Stop reason: HOSPADM

## 2019-01-18 RX ADMIN — BUSPIRONE HYDROCHLORIDE 7.5 MG: 5 TABLET ORAL at 17:38

## 2019-01-18 RX ADMIN — ACETAMINOPHEN 1000 MG: 500 TABLET, FILM COATED ORAL at 00:33

## 2019-01-18 RX ADMIN — ENOXAPARIN SODIUM 40 MG: 100 INJECTION SUBCUTANEOUS at 06:31

## 2019-01-18 RX ADMIN — CYCLOBENZAPRINE HYDROCHLORIDE 10 MG: 10 TABLET, FILM COATED ORAL at 04:12

## 2019-01-18 RX ADMIN — OXYCODONE HYDROCHLORIDE 20 MG: 10 TABLET ORAL at 22:44

## 2019-01-18 RX ADMIN — BUSPIRONE HYDROCHLORIDE 7.5 MG: 5 TABLET ORAL at 06:31

## 2019-01-18 RX ADMIN — POTASSIUM CHLORIDE AND SODIUM CHLORIDE: 900; 150 INJECTION, SOLUTION INTRAVENOUS at 10:07

## 2019-01-18 RX ADMIN — ACETAMINOPHEN 1000 MG: 500 TABLET, FILM COATED ORAL at 18:10

## 2019-01-18 RX ADMIN — POTASSIUM CHLORIDE AND SODIUM CHLORIDE: 900; 150 INJECTION, SOLUTION INTRAVENOUS at 20:05

## 2019-01-18 RX ADMIN — STANDARDIZED SENNA CONCENTRATE AND DOCUSATE SODIUM 2 TABLET: 8.6; 5 TABLET, FILM COATED ORAL at 17:37

## 2019-01-18 RX ADMIN — DULOXETINE 60 MG: 30 CAPSULE, DELAYED RELEASE ORAL at 17:38

## 2019-01-18 RX ADMIN — STANDARDIZED SENNA CONCENTRATE AND DOCUSATE SODIUM 2 TABLET: 8.6; 5 TABLET, FILM COATED ORAL at 06:32

## 2019-01-18 RX ADMIN — OXYCODONE HYDROCHLORIDE 15 MG: 5 TABLET ORAL at 06:32

## 2019-01-18 RX ADMIN — OXYCODONE HYDROCHLORIDE 20 MG: 10 TABLET ORAL at 17:38

## 2019-01-18 RX ADMIN — OXYCODONE HYDROCHLORIDE 20 MG: 10 TABLET ORAL at 14:21

## 2019-01-18 RX ADMIN — OXYCODONE HYDROCHLORIDE 15 MG: 5 TABLET ORAL at 00:39

## 2019-01-18 RX ADMIN — ACETAMINOPHEN 1000 MG: 500 TABLET, FILM COATED ORAL at 10:01

## 2019-01-18 RX ADMIN — DULOXETINE 60 MG: 30 CAPSULE, DELAYED RELEASE ORAL at 06:32

## 2019-01-18 ASSESSMENT — ENCOUNTER SYMPTOMS
VOMITING: 0
NAUSEA: 0
NERVOUS/ANXIOUS: 1
SORE THROAT: 0
DIZZINESS: 0
NAUSEA: 1
COUGH: 0
CHILLS: 0
FEVER: 0
BACK PAIN: 1
HEADACHES: 0
ABDOMINAL PAIN: 1
SHORTNESS OF BREATH: 0
WEAKNESS: 0
PALPITATIONS: 0
SINUS PAIN: 0

## 2019-01-18 ASSESSMENT — PAIN SCALES - GENERAL
PAINLEVEL_OUTOF10: 6
PAINLEVEL_OUTOF10: 7
PAINLEVEL_OUTOF10: 7
PAINLEVEL_OUTOF10: 6
PAINLEVEL_OUTOF10: 7
PAINLEVEL_OUTOF10: 1

## 2019-01-18 NOTE — CARE PLAN
Problem: Bowel/Gastric:  Goal: Normal bowel function is maintained or improved    Intervention: Educate patient and significant other/support system about diet, fluid intake, medications and activity to promote bowel function  Increase diet to BRAT diet for dinner as kitchen does not have many options for full liquids low fat.       Problem: Knowledge Deficit  Goal: Knowledge of disease process/condition, treatment plan, diagnostic tests, and medications will improve    Intervention: Assess knowledge level of disease process/condition, treatment plan, diagnostic tests, and medications  Educated about decrease in her labs this am and poc r/t them.

## 2019-01-18 NOTE — PROGRESS NOTES
Progress Note               Author: Doris Dioptessa Date & Time created: 1/18/2019  6:40 AM     Interval History:  Significant pain overnight, slightly improved this morning but overall worse than yesterday.     Review of Systems:  Review of Systems   Constitutional: Negative for chills and fever.   Gastrointestinal: Positive for abdominal pain. Negative for nausea and vomiting.       Physical Exam:  Physical Exam   Constitutional: She is oriented to person, place, and time. She appears well-developed and well-nourished. No distress.   HENT:   Head: Normocephalic and atraumatic.   Eyes: Conjunctivae are normal.   Neck: Normal range of motion.   Pulmonary/Chest: Effort normal.   Abdominal: Soft. She exhibits distension. There is tenderness.   Significant tenderness in the epigastrium   Musculoskeletal: Normal range of motion.   Neurological: She is alert and oriented to person, place, and time.   Skin: Skin is warm and dry. No rash noted. She is not diaphoretic. No erythema.   Psychiatric: She has a normal mood and affect. Her behavior is normal.       Labs:          Recent Labs      01/16/19 0449 01/17/19 0408 01/18/19 0428   SODIUM  135  135  132*   POTASSIUM  3.3*  3.0*  2.8*   CHLORIDE  101  99  98   CO2  27  26  26   BUN  11  7*  <5*   CREATININE  0.84  0.61  0.57   CALCIUM  8.1*  7.4*  7.7*     Recent Labs      01/16/19 0449 01/17/19 0408 01/18/19 0428   ALTSGPT  437*  258*  223*   ASTSGOT  248*  96*  117*   ALKPHOSPHAT  209*  180*  277*   TBILIRUBIN  3.6*  1.6*  3.9*   LIPASE  >400*  142*  57   GLUCOSE  100*  86  85     Recent Labs      01/17/19 0408 01/18/19 0428   RBC  3.21*  3.52*   HEMOGLOBIN  9.7*  10.7*   HEMATOCRIT  29.9*  31.2*   PLATELETCT  134*  194     Recent Labs      01/16/19 0449 01/17/19 0408 01/18/19 0428   WBC   --   3.4*  5.5   NEUTSPOLYS   --   72.60*  80.50*   LYMPHOCYTES   --   12.50*  7.40*   MONOCYTES   --   9.90  10.20   EOSINOPHILS   --   4.40  1.10    BASOPHILS   --   0.30  0.40   ASTSGOT  248*  96*  117*   ALTSGPT  437*  258*  223*   ALKPHOSPHAT  209*  180*  277*   TBILIRUBIN  3.6*  1.6*  3.9*     Hemodynamics:  Temp (24hrs), Av.7 °C (98.1 °F), Min:36.4 °C (97.5 °F), Max:37 °C (98.6 °F)  Temperature: 37 °C (98.6 °F)  Pulse  Av.8  Min: 60  Max: 121  Blood Pressure: 145/70     Respiratory:    Respiration: 16, Pulse Oximetry: 91 %        RUL Breath Sounds: Clear, RML Breath Sounds: Clear, RLL Breath Sounds: Diminished, CHRISTINA Breath Sounds: Clear, LLL Breath Sounds: Diminished  Fluids:    Intake/Output Summary (Last 24 hours) at 19 0640  Last data filed at 19 1640   Gross per 24 hour   Intake          1873.75 ml   Output                0 ml   Net          1873.75 ml     Weight: 76.2 kg (167 lb 15.9 oz)  GI/Nutrition:  Orders Placed This Encounter   Procedures   • Diet Order Regular     Standing Status:   Standing     Number of Occurrences:   1     Order Specific Question:   Diet:     Answer:   Regular [1]     Order Specific Question:   Pediatric modifications:     Answer:   Brat 1 [9]     Medical Decision Making, by Problem:  Active Hospital Problems    Diagnosis   • Choledocholithiasis [K80.50]   • Gallstone pancreatitis [K85.10]   • Mood disorder (HCC) [F39]   • Elevated liver enzymes [R74.8]   • Chest pain [R07.9]   • Chronic narcotic dependence (HCC) [F11.20]       Plan:  Lipase normalized, but increased transaminasis and bilirubin. Increased pain and well.   Recommend GI consult. May need MRCP vs. ERCP.   Patient is understandably disappointed, but understands that continued pain and increased LFTs are not normal and need more work up.   Dr. Whitt to take over care starting tomorrow.     9542 addendum: I have a call in to Dr. Law to discuss this case    Quality-Core Measures   Reviewed items::  Labs reviewed  Christine catheter::  No Christine

## 2019-01-18 NOTE — PROGRESS NOTES
Report received from danica Lara resting.  Morning assessment done about 0735 and started taking about her labs when Dr. Mayen came in and finished explaining the rest of her values.  Pt emotionally  Upset that she has to stay another day but understood why.  Dr. Barragan rounded about 1020 and changed her diet to BRAT as kitchen does not make a low fat full diet.  Pt rested most of the day until about 1530 when she had an episode of incontinence of urine.  Pt showered independently.  IV occluded after her shower and called Dr. Mayen; ok to leave IV out and d/c tele.  Medicated with pain medication this afternoon with her  and explained the lab valves to him.  Pt has been voiding clear yellow urine.  Pt reports her pain as more achy today but less distended and no cramps like 2 days ago.      Tele strip at 0819 shows SR at 76.      Measurements from am strip were as follows:  AL=0.16  QRS=0.08  QT=0.32    Tele Shift Summary:    Rhythm : SR to ST  Rate : 70-110s  Ectopy : Per CCT Ava pt had no ectopy.     Telemetry monitoring strips placed in pt's chart.

## 2019-01-18 NOTE — PROGRESS NOTES
Beside report received from Mansi SAUNDERS. Safety precautions in place. Call light within reach. Pt resting in bed.

## 2019-01-18 NOTE — PROGRESS NOTES
Bedside report received. Pt getting back from bathroom, steady on feet. No orders for tele. Pt comfortable at this time. Call light within reach, bed locked low position

## 2019-01-19 ENCOUNTER — APPOINTMENT (OUTPATIENT)
Dept: RADIOLOGY | Facility: MEDICAL CENTER | Age: 64
DRG: 417 | End: 2019-01-19
Attending: INTERNAL MEDICINE
Payer: COMMERCIAL

## 2019-01-19 LAB
ALBUMIN SERPL BCP-MCNC: 2.3 G/DL (ref 3.2–4.9)
ALBUMIN/GLOB SERPL: 0.8 G/DL
ALP SERPL-CCNC: 303 U/L (ref 30–99)
ALT SERPL-CCNC: 186 U/L (ref 2–50)
ANION GAP SERPL CALC-SCNC: 10 MMOL/L (ref 0–11.9)
AST SERPL-CCNC: 79 U/L (ref 12–45)
BILIRUB SERPL-MCNC: 1.9 MG/DL (ref 0.1–1.5)
BUN SERPL-MCNC: <5 MG/DL (ref 8–22)
CALCIUM SERPL-MCNC: 8 MG/DL (ref 8.4–10.2)
CHLORIDE SERPL-SCNC: 97 MMOL/L (ref 96–112)
CO2 SERPL-SCNC: 27 MMOL/L (ref 20–33)
CREAT SERPL-MCNC: 0.54 MG/DL (ref 0.5–1.4)
GLOBULIN SER CALC-MCNC: 2.8 G/DL (ref 1.9–3.5)
GLUCOSE SERPL-MCNC: 99 MG/DL (ref 65–99)
MAGNESIUM SERPL-MCNC: 1.8 MG/DL (ref 1.5–2.5)
POTASSIUM SERPL-SCNC: 2.9 MMOL/L (ref 3.6–5.5)
PROT SERPL-MCNC: 5.1 G/DL (ref 6–8.2)
SODIUM SERPL-SCNC: 134 MMOL/L (ref 135–145)

## 2019-01-19 PROCEDURE — 700111 HCHG RX REV CODE 636 W/ 250 OVERRIDE (IP): Performed by: ANESTHESIOLOGY

## 2019-01-19 PROCEDURE — 160208 HCHG ENDO MINUTES - EA ADDL 1 MIN LEVEL 4: Performed by: INTERNAL MEDICINE

## 2019-01-19 PROCEDURE — 770006 HCHG ROOM/CARE - MED/SURG/GYN SEMI*

## 2019-01-19 PROCEDURE — 110371 HCHG SHELL REV 272: Performed by: INTERNAL MEDICINE

## 2019-01-19 PROCEDURE — A9270 NON-COVERED ITEM OR SERVICE: HCPCS | Performed by: INTERNAL MEDICINE

## 2019-01-19 PROCEDURE — 700102 HCHG RX REV CODE 250 W/ 637 OVERRIDE(OP): Performed by: SURGERY

## 2019-01-19 PROCEDURE — 700101 HCHG RX REV CODE 250: Performed by: INTERNAL MEDICINE

## 2019-01-19 PROCEDURE — 99232 SBSQ HOSP IP/OBS MODERATE 35: CPT | Performed by: INTERNAL MEDICINE

## 2019-01-19 PROCEDURE — C1769 GUIDE WIRE: HCPCS | Performed by: INTERNAL MEDICINE

## 2019-01-19 PROCEDURE — 700101 HCHG RX REV CODE 250

## 2019-01-19 PROCEDURE — BF101ZZ FLUOROSCOPY OF BILE DUCTS USING LOW OSMOLAR CONTRAST: ICD-10-PCS | Performed by: INTERNAL MEDICINE

## 2019-01-19 PROCEDURE — 700102 HCHG RX REV CODE 250 W/ 637 OVERRIDE(OP): Performed by: INTERNAL MEDICINE

## 2019-01-19 PROCEDURE — 700102 HCHG RX REV CODE 250 W/ 637 OVERRIDE(OP)

## 2019-01-19 PROCEDURE — 160048 HCHG OR STATISTICAL LEVEL 1-5: Performed by: INTERNAL MEDICINE

## 2019-01-19 PROCEDURE — A9270 NON-COVERED ITEM OR SERVICE: HCPCS

## 2019-01-19 PROCEDURE — 160036 HCHG PACU - EA ADDL 30 MINS PHASE I: Performed by: INTERNAL MEDICINE

## 2019-01-19 PROCEDURE — 160002 HCHG RECOVERY MINUTES (STAT): Performed by: INTERNAL MEDICINE

## 2019-01-19 PROCEDURE — 160035 HCHG PACU - 1ST 60 MINS PHASE I: Performed by: INTERNAL MEDICINE

## 2019-01-19 PROCEDURE — 700102 HCHG RX REV CODE 250 W/ 637 OVERRIDE(OP): Performed by: HOSPITALIST

## 2019-01-19 PROCEDURE — A9270 NON-COVERED ITEM OR SERVICE: HCPCS | Performed by: HOSPITALIST

## 2019-01-19 PROCEDURE — 74330 X-RAY BILE/PANC ENDOSCOPY: CPT

## 2019-01-19 PROCEDURE — 700111 HCHG RX REV CODE 636 W/ 250 OVERRIDE (IP)

## 2019-01-19 PROCEDURE — 700117 HCHG RX CONTRAST REV CODE 255

## 2019-01-19 PROCEDURE — 160203 HCHG ENDO MINUTES - 1ST 30 MINS LEVEL 4: Performed by: INTERNAL MEDICINE

## 2019-01-19 PROCEDURE — 83735 ASSAY OF MAGNESIUM: CPT

## 2019-01-19 PROCEDURE — 80053 COMPREHEN METABOLIC PANEL: CPT

## 2019-01-19 PROCEDURE — 0FC98ZZ EXTIRPATION OF MATTER FROM COMMON BILE DUCT, VIA NATURAL OR ARTIFICIAL OPENING ENDOSCOPIC: ICD-10-PCS | Performed by: INTERNAL MEDICINE

## 2019-01-19 PROCEDURE — A9270 NON-COVERED ITEM OR SERVICE: HCPCS | Performed by: SURGERY

## 2019-01-19 PROCEDURE — 160009 HCHG ANES TIME/MIN: Performed by: INTERNAL MEDICINE

## 2019-01-19 RX ORDER — POTASSIUM CHLORIDE 20 MEQ/1
40 TABLET, EXTENDED RELEASE ORAL ONCE
Status: DISCONTINUED | OUTPATIENT
Start: 2019-01-19 | End: 2019-01-19

## 2019-01-19 RX ORDER — POTASSIUM CHLORIDE 20 MEQ/1
40 TABLET, EXTENDED RELEASE ORAL 3 TIMES DAILY
Status: DISCONTINUED | OUTPATIENT
Start: 2019-01-19 | End: 2019-01-20 | Stop reason: HOSPADM

## 2019-01-19 RX ORDER — DIPHENHYDRAMINE HYDROCHLORIDE 50 MG/ML
12.5 INJECTION INTRAMUSCULAR; INTRAVENOUS
Status: DISCONTINUED | OUTPATIENT
Start: 2019-01-19 | End: 2019-01-19 | Stop reason: HOSPADM

## 2019-01-19 RX ORDER — HALOPERIDOL 5 MG/ML
1 INJECTION INTRAMUSCULAR
Status: COMPLETED | OUTPATIENT
Start: 2019-01-19 | End: 2019-01-19

## 2019-01-19 RX ORDER — OXYCODONE HYDROCHLORIDE AND ACETAMINOPHEN 5; 325 MG/1; MG/1
1 TABLET ORAL
Status: DISCONTINUED | OUTPATIENT
Start: 2019-01-19 | End: 2019-01-19 | Stop reason: HOSPADM

## 2019-01-19 RX ORDER — SODIUM CHLORIDE, SODIUM LACTATE, POTASSIUM CHLORIDE, CALCIUM CHLORIDE 600; 310; 30; 20 MG/100ML; MG/100ML; MG/100ML; MG/100ML
INJECTION, SOLUTION INTRAVENOUS CONTINUOUS
Status: DISCONTINUED | OUTPATIENT
Start: 2019-01-19 | End: 2019-01-19 | Stop reason: HOSPADM

## 2019-01-19 RX ORDER — MIDAZOLAM HYDROCHLORIDE 1 MG/ML
1 INJECTION INTRAMUSCULAR; INTRAVENOUS
Status: DISCONTINUED | OUTPATIENT
Start: 2019-01-19 | End: 2019-01-19 | Stop reason: HOSPADM

## 2019-01-19 RX ORDER — HYDRALAZINE HYDROCHLORIDE 20 MG/ML
5 INJECTION INTRAMUSCULAR; INTRAVENOUS
Status: DISCONTINUED | OUTPATIENT
Start: 2019-01-19 | End: 2019-01-19 | Stop reason: HOSPADM

## 2019-01-19 RX ORDER — ONDANSETRON 2 MG/ML
4 INJECTION INTRAMUSCULAR; INTRAVENOUS
Status: DISCONTINUED | OUTPATIENT
Start: 2019-01-19 | End: 2019-01-19 | Stop reason: HOSPADM

## 2019-01-19 RX ORDER — OXYCODONE HYDROCHLORIDE AND ACETAMINOPHEN 5; 325 MG/1; MG/1
2 TABLET ORAL
Status: DISCONTINUED | OUTPATIENT
Start: 2019-01-19 | End: 2019-01-19 | Stop reason: HOSPADM

## 2019-01-19 RX ORDER — INDOMETHACIN 50 MG/1
SUPPOSITORY RECTAL
Status: DISPENSED
Start: 2019-01-19 | End: 2019-01-19

## 2019-01-19 RX ORDER — INDOMETHACIN 50 MG/1
SUPPOSITORY RECTAL
Status: DISCONTINUED | OUTPATIENT
Start: 2019-01-19 | End: 2019-01-19 | Stop reason: HOSPADM

## 2019-01-19 RX ORDER — MEPERIDINE HYDROCHLORIDE 25 MG/ML
12.5 INJECTION INTRAMUSCULAR; INTRAVENOUS; SUBCUTANEOUS
Status: DISCONTINUED | OUTPATIENT
Start: 2019-01-19 | End: 2019-01-19 | Stop reason: HOSPADM

## 2019-01-19 RX ORDER — SODIUM CHLORIDE AND POTASSIUM CHLORIDE 300; 900 MG/100ML; MG/100ML
INJECTION, SOLUTION INTRAVENOUS CONTINUOUS
Status: DISCONTINUED | OUTPATIENT
Start: 2019-01-19 | End: 2019-01-19

## 2019-01-19 RX ORDER — MIDAZOLAM HYDROCHLORIDE 1 MG/ML
INJECTION INTRAMUSCULAR; INTRAVENOUS
Status: DISPENSED
Start: 2019-01-19 | End: 2019-01-19

## 2019-01-19 RX ORDER — SODIUM CHLORIDE, SODIUM LACTATE, POTASSIUM CHLORIDE, CALCIUM CHLORIDE 600; 310; 30; 20 MG/100ML; MG/100ML; MG/100ML; MG/100ML
INJECTION, SOLUTION INTRAVENOUS ONCE
Status: COMPLETED | OUTPATIENT
Start: 2019-01-19 | End: 2019-01-19

## 2019-01-19 RX ORDER — HALOPERIDOL 5 MG/ML
INJECTION INTRAMUSCULAR
Status: DISPENSED
Start: 2019-01-19 | End: 2019-01-19

## 2019-01-19 RX ADMIN — POTASSIUM CHLORIDE 40 MEQ: 1500 TABLET, EXTENDED RELEASE ORAL at 13:45

## 2019-01-19 RX ADMIN — DULOXETINE 60 MG: 30 CAPSULE, DELAYED RELEASE ORAL at 18:36

## 2019-01-19 RX ADMIN — HALOPERIDOL 1 MG: 5 INJECTION INTRAMUSCULAR at 12:10

## 2019-01-19 RX ADMIN — SODIUM CHLORIDE, SODIUM LACTATE, POTASSIUM CHLORIDE, CALCIUM CHLORIDE: 600; 310; 30; 20 INJECTION, SOLUTION INTRAVENOUS at 09:40

## 2019-01-19 RX ADMIN — POTASSIUM CHLORIDE 40 MEQ: 1500 TABLET, EXTENDED RELEASE ORAL at 18:36

## 2019-01-19 RX ADMIN — HALOPERIDOL 1 MG: 5 INJECTION INTRAMUSCULAR at 11:55

## 2019-01-19 RX ADMIN — OXYCODONE HYDROCHLORIDE 20 MG: 10 TABLET ORAL at 19:31

## 2019-01-19 RX ADMIN — OXYCODONE HYDROCHLORIDE 20 MG: 10 TABLET ORAL at 04:52

## 2019-01-19 RX ADMIN — FENTANYL CITRATE 25 MCG: 50 INJECTION, SOLUTION INTRAMUSCULAR; INTRAVENOUS at 12:00

## 2019-01-19 RX ADMIN — FENTANYL CITRATE 25 MCG: 50 INJECTION, SOLUTION INTRAMUSCULAR; INTRAVENOUS at 12:05

## 2019-01-19 RX ADMIN — FENTANYL CITRATE 25 MCG: 50 INJECTION, SOLUTION INTRAMUSCULAR; INTRAVENOUS at 12:40

## 2019-01-19 RX ADMIN — FENTANYL CITRATE 25 MCG: 50 INJECTION, SOLUTION INTRAMUSCULAR; INTRAVENOUS at 11:55

## 2019-01-19 RX ADMIN — ACETAMINOPHEN 500 MG: 500 TABLET, FILM COATED ORAL at 19:33

## 2019-01-19 RX ADMIN — HALOPERIDOL 1 MG: 5 INJECTION INTRAMUSCULAR at 11:40

## 2019-01-19 RX ADMIN — POTASSIUM CHLORIDE AND SODIUM CHLORIDE: 900; 300 INJECTION, SOLUTION INTRAVENOUS at 08:36

## 2019-01-19 RX ADMIN — ACETAMINOPHEN 500 MG: 500 TABLET, FILM COATED ORAL at 13:29

## 2019-01-19 RX ADMIN — OXYCODONE HYDROCHLORIDE 20 MG: 10 TABLET ORAL at 13:28

## 2019-01-19 RX ADMIN — BUSPIRONE HYDROCHLORIDE 7.5 MG: 5 TABLET ORAL at 18:36

## 2019-01-19 RX ADMIN — ACETAMINOPHEN 500 MG: 500 TABLET, FILM COATED ORAL at 04:55

## 2019-01-19 ASSESSMENT — ENCOUNTER SYMPTOMS
PALPITATIONS: 0
BACK PAIN: 1
COUGH: 0
SHORTNESS OF BREATH: 0
NAUSEA: 0
NAUSEA: 1
NERVOUS/ANXIOUS: 0
ABDOMINAL PAIN: 1
SINUS PAIN: 0
VOMITING: 0
SORE THROAT: 0
HEADACHES: 0
WEAKNESS: 0
FEVER: 0
DIZZINESS: 0

## 2019-01-19 ASSESSMENT — PATIENT HEALTH QUESTIONNAIRE - PHQ9
1. LITTLE INTEREST OR PLEASURE IN DOING THINGS: NOT AT ALL
2. FEELING DOWN, DEPRESSED, IRRITABLE, OR HOPELESS: NOT AT ALL
SUM OF ALL RESPONSES TO PHQ9 QUESTIONS 1 AND 2: 0

## 2019-01-19 ASSESSMENT — PAIN SCALES - GENERAL
PAINLEVEL_OUTOF10: 6
PAINLEVEL_OUTOF10: 5
PAINLEVEL_OUTOF10: 2
PAINLEVEL_OUTOF10: 0
PAINLEVEL_OUTOF10: 0
PAINLEVEL_OUTOF10: 6
PAINLEVEL_OUTOF10: 2
PAINLEVEL_OUTOF10: 0
PAINLEVEL_OUTOF10: 5
PAINLEVEL_OUTOF10: 2

## 2019-01-19 NOTE — OP REPORT
DATE OF SERVICE:  01/19/2019    PROCEDURE PERFORMED:  Endoscopic retrograde cholangiography with   sphincterotomy and removal of small stone.    PHYSICIAN:  Lewis Law MD    MEDICATIONS:  General anesthesia.    Anesthesiologist present.    CONSENT:  Procedure risks and benefits reviewed thoroughly with the patient,   risks including but not limited to bleeding, perforation, side effects of   medication were informed.  Patient voiced understanding and agreed to proceed.    Additional risks inherent to ERCP that being mild, moderate, severe   pancreatitis that could lead to postprocedural pain, prolonged   hospitalization, intensive care unit stay, and/or death were reviewed with the   patient, who voiced understanding and agreed to proceed.    DESCRIPTION OF PROCEDURE:  Patient was placed in a prone position after   intubation and sedation.  A bite block was inserted in the mouth and a   side-viewing duodenoscope was passed carefully and easily under indirect   visualization into the esophagus, past the second portion of duodenum brought   in a shortened position.  The ampulla was adjacent in the middle of 2 large   mouth duodenal diverticulum.  A CleverCut sphincterotome with a 0.035 wire was   utilized to cannulate the ampulla.  Initial pancreatic duct cannulation was   appreciated.  This was only done once.  After which the tip of the tome was   manipulated with bowing and then the wire was advanced in the trajectory of   the bile duct, which was confirmed by aspiration of bile and confirmation by   cholangiogram, which revealed a filling defect in the distal common bile duct   and markedly dilated common bile duct.  After which the tome was retracted.    Sphincterotomy was performed to approximately 12 mm.  After which the wire was   maintained and the tome was exchanged for a 9-12 mm balloon and the balloon   was utilized to dredge the system.  Because of the marked dilation of the   distal common bile  duct, the balloon was switched to 15 mm balloon.  The   entire common bile duct was diffusely dilated to approximately 15 mm.  There   was no associated choledochal cyst.  This may be a type 1 choledochocele.    After which dredging with the 15 mm balloon was allowed for removal of the   balloon through the cut ampulla with removal of small sludge.  After which all   the contrast that had been injected into the intra and extrahepatic system   was spontaneously exited the ampulla without any obstruction to flow.    Intrahepatics were not dilated.  The stomach was suctioned, desufflated, and   the scope was removed after confirmation of greater than 50% of the contrast   injected, spontaneously exiting the ampulla within a matter of less than 1   minute.  The stomach was suctioned, desufflated, the scope was removed.    COMPLICATIONS:  None.    BLOOD LOSS:  None.    SPECIMENS:  None.    RECOMMENDATIONS:  Successful sphincterotomy, removal of sludge and   decompression of biliary system.  The cholangiogram reveals likely type 1   choledochocele.  Patient no longer has any obstruction to flow and liver tests   will likely improve now.  Because we wired the pancreatic duct on a single   occasion, I did place 100 mg of indomethacin per rectum to decrease risk for   ERCP-induced pancreatitis.  We will follow patient clinically and further   treatment recommendations to follow.  Patient may be started on a clear liquid   diet, advanced to low fat diet as tolerated.  Please follow liver tests to   ensure normalization.  The above findings and recommendations will be reviewed   with the patient.  All questions will be answered to her satisfaction upon   recovery.    Please note that all fluoroscopic images were interpreted and performed by me.       ____________________________________     MD NARGIS Franklin / NTS    DD:  01/19/2019 11:29:06  DT:  01/19/2019 11:51:45    D#:  [id number]  Job#:  [id number]

## 2019-01-19 NOTE — PROGRESS NOTES
Patient sleeping, iv infusing no signs of infiltration, pt has regular respirations and call light in reach.

## 2019-01-19 NOTE — OR NURSING
1130 - Patient admitted from [facility] to PACU - very drowsy with spontaneous respirations and clear breath sounds bilaterally. Abdomen slightly distended and soft.   Patient denies pain. Denies nausea. Report from Dr. Cortes and DELPHINE Ochoa. VS as noted.     1140 - C/O nausea - medicated with Haldol as noted on MAR.     1145 - Less drowsy. Denies pain. Nausea persisting. VS as noted.     1200 - Drowsy. Pain 5/10 - medicated with Fentanyl as noted on MAR. Nausea persists - medicating with Haldol as noted on MAR. VS as noted.     1215 - Resting with eyes closed to easily awakened and cooperative then back to resting with eyes closed. Pain now 2/10 and tolerable per patient. Nausea resolved. VS as noted.     1230 - Remains as above. VS as noted.     1240 - Pain back to 5/10 - medicated with Fentanyl as noted on MAR.     1255 - Resting with eyes closed to easily awakened and cooperative then back to resting with eyes closed. Pain now 2/10 and tolerable. Nausea resolved. Abdomen soft. VS as noted. Meets criteria for transfer to room. Report called to CHIP Forrest.     1310 - Patient transferred via bed to room on O2 via NC at 2 lpm by this RN and additional PACU RN.  - Additional handoff report to CHIP Forrest upon arrival. Patient attached to all bedside monitoring equipment upon arrival to room.

## 2019-01-19 NOTE — PROGRESS NOTES
Hospital Medicine Daily Progress Note    Date of Service  1/18/2019    Chief Complaint  Abdominal pain    Hospital Course    *63-year-old female with history of chronic pain, depression presents with right upper quadrant pain radiating to her chest and between her shoulder blades she had significant LFT elevation however subsequent MRCP did not reveal any obstructing stones.  She was taken to the OR on 1/14 by Dr. Mayen-underwent lap jef, the following morning LFTs unfortunately were worse.*      Interval Problem Update  Multiple friends are visiting-she is in better spirits but complains of severe urinary frequency and urgency.  She still has abdominal pain and no appetite.  GI consulted due to rising LFTs.    Consultants/Specialty  General surgery  [facility]    Code Status  Full    Disposition  Home    Review of Systems  Review of Systems   Constitutional: Negative for malaise/fatigue.   HENT: Negative for sinus pain and sore throat.    Respiratory: Negative for cough and shortness of breath.    Cardiovascular: Negative for chest pain and palpitations.   Gastrointestinal: Positive for abdominal pain (Worse) and nausea. Negative for vomiting.   Genitourinary: Positive for frequency and urgency.   Musculoskeletal: Positive for back pain (Chronic).   Skin: Negative for itching and rash.   Neurological: Negative for dizziness, weakness and headaches.   Psychiatric/Behavioral: The patient is nervous/anxious.         Physical Exam  Temp:  [36.3 °C (97.3 °F)-37.1 °C (98.7 °F)] 36.3 °C (97.3 °F)  Pulse:  [] 81  Resp:  [16-18] 18  BP: (139-151)/(70-75) 151/75  SpO2:  [91 %-100 %] 100 %    Physical Exam   Constitutional: She is oriented to person, place, and time. She appears well-developed and well-nourished. No distress.   HENT:   Head: Normocephalic and atraumatic.   Right Ear: External ear normal.   Left Ear: External ear normal.   Nose: Nose normal.   Mouth/Throat: Oropharynx is clear and  moist. No oropharyngeal exudate.   Eyes: Pupils are equal, round, and reactive to light. Conjunctivae and EOM are normal. Right eye exhibits no discharge. Left eye exhibits no discharge. No scleral icterus.   Neck: Neck supple.   Cardiovascular: Normal rate and regular rhythm.    Pulmonary/Chest: Effort normal and breath sounds normal.   Abdominal: Soft. She exhibits distension (More generalized). She exhibits no mass. There is tenderness (More diffuse over epigastrium and right upper quadrant, also suprapubic is currently has to void). There is no rebound and no guarding.   Decreased bowel sounds   Musculoskeletal: She exhibits no edema.   Neurological: She is alert and oriented to person, place, and time.   Skin: Skin is warm and dry. She is not diaphoretic.   Psychiatric:   Less anxious   Nursing note and vitals reviewed.      Fluids    Intake/Output Summary (Last 24 hours) at 01/18/19 1650  Last data filed at 01/18/19 1200   Gross per 24 hour   Intake              120 ml   Output                0 ml   Net              120 ml       Laboratory  Recent Labs      01/17/19   0408  01/18/19   0428   WBC  3.4*  5.5   RBC  3.21*  3.52*   HEMOGLOBIN  9.7*  10.7*   HEMATOCRIT  29.9*  31.2*   MCV  93.1  88.6   MCH  30.2  30.4   MCHC  32.4*  34.3   RDW  43.8  40.3   PLATELETCT  134*  194   MPV  10.4  10.0     Recent Labs      01/16/19   0449  01/17/19   0408  01/18/19   0428   SODIUM  135  135  132*   POTASSIUM  3.3*  3.0*  2.8*   CHLORIDE  101  99  98   CO2  27  26  26   GLUCOSE  100*  86  85   BUN  11  7*  <5*   CREATININE  0.84  0.61  0.57   CALCIUM  8.1*  7.4*  7.7*                   Imaging  [id number] [id number]   Final Result      No evidence of bowel obstruction.      [id number]   Final Result         1. No choledocholithiasis.   2. Unchanged 10 mm dilatation of the CBD, nonspecific. No pancreatic ductal dilatation.   3. Cholelithiasis. No evidence of cholecystitis.   4. Colonic diverticulosis.      CT-CTA COMPLETE  THORACOABDOMINAL AORTA   Final Result      1.  No aortic aneurysm or dissection. No pulmonary embolus.   2.  Colonic diverticulosis.   3.  Mild circumferential mural thickening of the transverse and descending colon, likely related to underdistention or sequela of prior colitis. Acute colitis is less likely.         US-RUQ   Final Result         1. Cholelithiasis. No evidence of cholecystitis.   2. Dilated, 10 mm CBD. If there is clinical concern for choledocholithiasis, consider MRCP.   3. Increased hepatic echogenicity, suggestive of hepatic steatosis or hepatocellular disease.      DX-CHEST-PORTABLE (1 VIEW)   Final Result      No acute cardiopulmonary abnormality.           Assessment/Plan  Choledocholithiasis   Assessment & Plan    Passed stone per MRCP  However postoperative LFTs were elevated including bili, alk phos  Then lipase up and epigastric pain>   Then lipase down but pain continues and LFT back up> GI called  Continue IVF     Gallstone pancreatitis   Assessment & Plan    Lipase normal but no appetite and   Plan as noted  Decrease fluids as c/o urgency and frequency     Mood disorder (HCC)   Assessment & Plan    With significant anxiety  Currently stable on meds     Elevated liver enzymes   Assessment & Plan    Back up> GI     Chronic narcotic dependence (HCC)   Assessment & Plan    More pain, oxycodone increased     Chest pain   Assessment & Plan    -Actual right upper quadrant pain, no ACS          VTE prophylaxis: Lovenox

## 2019-01-19 NOTE — CONSULTS
DATE OF SERVICE:  01/18/2019    GASTROENTEROLOGY CONSULTATION    REQUESTING PHYSICIAN:  Paulina Barragan MD    REASON FOR CONSULTATION:  Obstructive jaundice.    HISTORY OF PRESENT ILLNESS:  This is a 63-year-old female who was in her usual   state of health until the day of admission.  On 01/13, she was awakened from   sleep with severe abdominal pain.  She describes a severe pressure-like pain   in the epigastrium, which radiated straight into her back.  This was   associated with a single episode of vomiting consisting of bland emesis.    The patient was brought to the emergency room where she continued to have   unrelenting pain and was admitted.  She was found to have evidence of   cholelithiasis on imaging.  She was evaluated by Dr. Mayen of surgery and   underwent a laparoscopic cholecystectomy on 01/14.  On the second day of her   admission, her liver enzymes and bilirubin suddenly climbed.  Her admission   liver enzymes were normal.  She was found to have serologic evidence of   pancreatitis as well.  Further evaluation with MRCP showed evidence of a   dilated biliary tree without obvious filling defects.    At present, she continues to have epigastric discomfort, which is controlled   with pain medications.  She has mild nausea, but is tolerating a clear liquid   diet.    The patient denies any history of biliary colic or pancreatitis in the past.    The patient has had a prior colonoscopy by me approximately 3 years ago as   well.    REVIEW OF SYSTEMS:  PSYCHIATRIC:  No anxiety or depression.  MUSCULOSKELETAL:  Positive for chronic joint pain.  DERMATOLOGIC:  No rash or pruritus.  GASTROINTESTINAL:  As above.  CARDIOVASCULAR:  No chest pain, palpitation, shortness of breath.  PULMONARY:  No cough or wheezing.  ALLERGY AND IMMUNOLOGY:  No hay fever.  ENDOCRINE:  No polyuria or polydipsia.  CONSTITUTIONAL:  No fevers, chills, or weight loss.  GENITOURINARY:  Denies dysuria.    PAST MEDICAL  HISTORY:  Chronic pain.    PAST SURGICAL HISTORY:  Right total hip arthroplasty, right ACL repair,   colonoscopy, total abdominal hysterectomy.    ALLERGIES:  None.    SOCIAL HISTORY:  She is .  She denies tobacco use, occasionally drinks   wine.    OUTPATIENT MEDICATIONS:  Cymbalta, BuSpar, Flexeril, ibuprofen, Percocet.    FAMILY HISTORY:  Noncontributory.    PHYSICAL EXAMINATION:  VITAL SIGNS:  Temperature is 36.3, pulse 81, respirations 18, /75.  GENERAL APPEARANCE:  Pleasant female, in no acute distress.  She is alert and   oriented x3.  HEENT:  Sclerae are mildly icteric.  Oropharynx is moist.  NECK:  Supple.  No adenopathy.  HEART:  Regular rate and rhythm.  LUNGS:  Clear to auscultation.  ABDOMEN:  Soft with positive bowel sounds.  There is moderate epigastric   tenderness.  There is no guarding, rebound or rigidity.  EXTREMITIES:  Warm and dry without clubbing, cyanosis or edema.  SKIN:  Shows no rashes.    LABORATORY DATA:  White count 5, hematocrit 31, platelet count 194,000.    Sodium 132, potassium 2.8, chloride 98, bicarbonate 26, BUN 5, creatinine 0.5,   , , alkaline phosphatase 277, bilirubin 3.9.  INR is 0.9.    DIAGNOSTIC DATA:  Abdominal ultrasound showed normal liver contour with   increased echogenicity, cholelithiasis, common bile duct measuring 1 cm.  MRCP   shows normal liver, common bile duct dilated to 10 mm.    ASSESSMENT AND PLAN:  1.  A 63-year-old female with obstructive jaundice.  The patient likely has   occult choledocholithiasis despite negative MRCP.  Her liver enzymes and   bilirubin arising after initial decrease.  Plan:  We will proceed with ERCP   with sphincterotomy and possible stone extraction tomorrow.  Informed consent   was obtained after explanation of risks, benefits, and alternatives.  2.  Gallstone pancreatitis, clinically stable.  Plan:  Continue clear liquid   diet.  3.  Cholelithiasis, status post cholecystectomy.  4.  Anemia.  5.   Chronic pain.  6.  Hypokalemia.  Plan:  Replete potassium prior to ERCP tomorrow.       ____________________________________     DO MONICA HECTOR / DENNY    DD:  01/18/2019 15:37:37  DT:  01/18/2019 17:28:13    D#:  [id number]  Job#:  [id number]    cc:  ____ Aaron

## 2019-01-19 NOTE — PROGRESS NOTES
Surgical Progress Note    Author: Rich Whitt Date & Time created: 2019   8:22 AM     Interval Events:  Complains of epigastric pain, mild nausea.  Scheduled for ERCP today.    Review of Systems   Constitutional: Negative for fever.   Gastrointestinal: Positive for abdominal pain and nausea.     Hemodynamics:  Temp (24hrs), Av.7 °C (98 °F), Min:36.3 °C (97.3 °F), Max:37.1 °C (98.7 °F)  Temperature: 36.9 °C (98.4 °F)  Pulse  Av.2  Min: 60  Max: 121  Blood Pressure: 118/83     Respiratory:    Respiration: 18, Pulse Oximetry: 90 %        RUL Breath Sounds: Clear, RML Breath Sounds: Clear, RLL Breath Sounds: Diminished, CHRISTINA Breath Sounds: Clear, LLL Breath Sounds: Diminished  Neuro:  GCS = 15       Fluids:    Intake/Output Summary (Last 24 hours) at 19 0822  Last data filed at 19 0645   Gross per 24 hour   Intake              720 ml   Output                0 ml   Net              720 ml        Current Diet Order   Procedures   • Diet NPO     Physical Exam   Constitutional: She is oriented to person, place, and time. She appears well-developed and well-nourished. No distress.   Eyes: Pupils are equal, round, and reactive to light. No scleral icterus.   Cardiovascular: Normal rate, regular rhythm and normal heart sounds.    Pulmonary/Chest: Effort normal and breath sounds normal.   Abdominal: Soft. There is tenderness.   Epigastric region   Neurological: She is alert and oriented to person, place, and time.   Skin: Skin is warm and dry.   Psychiatric: She has a normal mood and affect. Her behavior is normal.     Labs:  Recent Results (from the past 24 hour(s))   COMP METABOLIC PANEL    Collection Time: 19  3:51 AM   Result Value Ref Range    Sodium 134 (L) 135 - 145 mmol/L    Potassium 2.9 (L) 3.6 - 5.5 mmol/L    Chloride 97 96 - 112 mmol/L    Co2 27 20 - 33 mmol/L    Anion Gap 10.0 0.0 - 11.9    Glucose 99 65 - 99 mg/dL    Bun <5 (L) 8 - 22 mg/dL    Creatinine 0.54 0.50 - 1.40  mg/dL    Calcium 8.0 (L) 8.4 - 10.2 mg/dL    AST(SGOT) 79 (H) 12 - 45 U/L    ALT(SGPT) 186 (H) 2 - 50 U/L    Alkaline Phosphatase 303 (H) 30 - 99 U/L    Total Bilirubin 1.9 (H) 0.1 - 1.5 mg/dL    Albumin 2.3 (L) 3.2 - 4.9 g/dL    Total Protein 5.1 (L) 6.0 - 8.2 g/dL    Globulin 2.8 1.9 - 3.5 g/dL    A-G Ratio 0.8 g/dL   ESTIMATED GFR    Collection Time: 01/19/19  3:51 AM   Result Value Ref Range    GFR If African American >60 >60 mL/min/1.73 m 2    GFR If Non African American >60 >60 mL/min/1.73 m 2   MAGNESIUM    Collection Time: 01/19/19  3:51 AM   Result Value Ref Range    Magnesium 1.8 1.5 - 2.5 mg/dL     Medical Decision Making, by Problem:  Active Hospital Problems    Diagnosis   • Choledocholithiasis [K80.50]     Priority: High   • Gallstone pancreatitis [K85.10]   • Mood disorder (HCC) [F39]   • Elevated liver enzymes [R74.8]   • Chest pain [R07.9]   • Chronic narcotic dependence (HCC) [F11.20]     Plan:  Follow up results of ERCP today.  AM CBC with dif, CMP, lipase tomorrow.    Quality Measures:  Quality-Core Measures   Reviewed items::  Labs reviewed and Medications reviewed  Christine catheter::  No Christine      Discussed patient condition with Patient

## 2019-01-19 NOTE — CARE PLAN
Problem: Infection  Goal: Will remain free from infection  Outcome: PROGRESSING AS EXPECTED  No s/s of infection noted. Afebrile throughout shift. Lap sites remain CDI, well approximated with dermabond. Surrounding ecchymosis, but no areas of edema/erythema.    Problem: Bowel/Gastric:  Goal: Will not experience complications related to bowel motility  Outcome: PROGRESSING AS EXPECTED  Abdomen soft but tender to palpation. Bowel sounds normoactive to BUQ and hyperactive to BLQ. Denies N/V overnight. Passing flatus but no BM overnight.  Tolerating diet but has been NPO since midnight for ERCP today. NS + 20 KCL continues at 50mL/hr.

## 2019-01-19 NOTE — PROGRESS NOTES
Patient back to room, denies nausea, pain medications given per MAR. Vitals stable per flowsheet. Potassium replaced.

## 2019-01-19 NOTE — PROGRESS NOTES
Bedside report received from CHIP Scott. Patient resting comfortably in bed. No questions/concerns/needs verbalized at this time. Falls precautions remain in place.

## 2019-01-20 ENCOUNTER — PATIENT OUTREACH (OUTPATIENT)
Dept: HEALTH INFORMATION MANAGEMENT | Facility: OTHER | Age: 64
End: 2019-01-20

## 2019-01-20 VITALS
SYSTOLIC BLOOD PRESSURE: 137 MMHG | HEIGHT: 66 IN | HEART RATE: 79 BPM | TEMPERATURE: 98.3 F | BODY MASS INDEX: 27 KG/M2 | OXYGEN SATURATION: 94 % | RESPIRATION RATE: 18 BRPM | WEIGHT: 167.99 LBS | DIASTOLIC BLOOD PRESSURE: 75 MMHG

## 2019-01-20 LAB
ALBUMIN SERPL BCP-MCNC: 2.3 G/DL (ref 3.2–4.9)
ALBUMIN/GLOB SERPL: 0.8 G/DL
ALP SERPL-CCNC: 305 U/L (ref 30–99)
ALT SERPL-CCNC: 182 U/L (ref 2–50)
ANION GAP SERPL CALC-SCNC: 7 MMOL/L (ref 0–11.9)
AST SERPL-CCNC: 106 U/L (ref 12–45)
BASOPHILS # BLD AUTO: 0.3 % (ref 0–1.8)
BASOPHILS # BLD: 0.02 K/UL (ref 0–0.12)
BILIRUB SERPL-MCNC: 1.5 MG/DL (ref 0.1–1.5)
BUN SERPL-MCNC: 9 MG/DL (ref 8–22)
CALCIUM SERPL-MCNC: 8.4 MG/DL (ref 8.4–10.2)
CHLORIDE SERPL-SCNC: 101 MMOL/L (ref 96–112)
CO2 SERPL-SCNC: 29 MMOL/L (ref 20–33)
CREAT SERPL-MCNC: 0.66 MG/DL (ref 0.5–1.4)
EOSINOPHIL # BLD AUTO: 0.02 K/UL (ref 0–0.51)
EOSINOPHIL NFR BLD: 0.3 % (ref 0–6.9)
ERYTHROCYTE [DISTWIDTH] IN BLOOD BY AUTOMATED COUNT: 41.4 FL (ref 35.9–50)
GLOBULIN SER CALC-MCNC: 2.9 G/DL (ref 1.9–3.5)
GLUCOSE SERPL-MCNC: 107 MG/DL (ref 65–99)
HCT VFR BLD AUTO: 35.4 % (ref 37–47)
HGB BLD-MCNC: 11.8 G/DL (ref 12–16)
IMM GRANULOCYTES # BLD AUTO: 0.04 K/UL (ref 0–0.11)
IMM GRANULOCYTES NFR BLD AUTO: 0.7 % (ref 0–0.9)
LIPASE SERPL-CCNC: 30 U/L (ref 7–58)
LYMPHOCYTES # BLD AUTO: 0.85 K/UL (ref 1–4.8)
LYMPHOCYTES NFR BLD: 14.5 % (ref 22–41)
MCH RBC QN AUTO: 30 PG (ref 27–33)
MCHC RBC AUTO-ENTMCNC: 33.3 G/DL (ref 33.6–35)
MCV RBC AUTO: 90.1 FL (ref 81.4–97.8)
MONOCYTES # BLD AUTO: 0.57 K/UL (ref 0–0.85)
MONOCYTES NFR BLD AUTO: 9.7 % (ref 0–13.4)
NEUTROPHILS # BLD AUTO: 4.37 K/UL (ref 2–7.15)
NEUTROPHILS NFR BLD: 74.5 % (ref 44–72)
NRBC # BLD AUTO: 0 K/UL
NRBC BLD-RTO: 0 /100 WBC
PLATELET # BLD AUTO: 266 K/UL (ref 164–446)
PMV BLD AUTO: 9.8 FL (ref 9–12.9)
POTASSIUM SERPL-SCNC: 3.8 MMOL/L (ref 3.6–5.5)
PROT SERPL-MCNC: 5.2 G/DL (ref 6–8.2)
RBC # BLD AUTO: 3.93 M/UL (ref 4.2–5.4)
SODIUM SERPL-SCNC: 137 MMOL/L (ref 135–145)
WBC # BLD AUTO: 5.9 K/UL (ref 4.8–10.8)

## 2019-01-20 PROCEDURE — A9270 NON-COVERED ITEM OR SERVICE: HCPCS | Performed by: INTERNAL MEDICINE

## 2019-01-20 PROCEDURE — 80053 COMPREHEN METABOLIC PANEL: CPT

## 2019-01-20 PROCEDURE — A9270 NON-COVERED ITEM OR SERVICE: HCPCS | Performed by: SURGERY

## 2019-01-20 PROCEDURE — 99239 HOSP IP/OBS DSCHRG MGMT >30: CPT | Performed by: INTERNAL MEDICINE

## 2019-01-20 PROCEDURE — 700102 HCHG RX REV CODE 250 W/ 637 OVERRIDE(OP): Performed by: INTERNAL MEDICINE

## 2019-01-20 PROCEDURE — A9270 NON-COVERED ITEM OR SERVICE: HCPCS | Performed by: HOSPITALIST

## 2019-01-20 PROCEDURE — 85025 COMPLETE CBC W/AUTO DIFF WBC: CPT

## 2019-01-20 PROCEDURE — 83690 ASSAY OF LIPASE: CPT

## 2019-01-20 PROCEDURE — 700102 HCHG RX REV CODE 250 W/ 637 OVERRIDE(OP): Performed by: SURGERY

## 2019-01-20 PROCEDURE — 700102 HCHG RX REV CODE 250 W/ 637 OVERRIDE(OP): Performed by: HOSPITALIST

## 2019-01-20 RX ORDER — POTASSIUM CHLORIDE 20 MEQ/1
20 TABLET, EXTENDED RELEASE ORAL 3 TIMES DAILY
Qty: 21 TAB | Refills: 0 | Status: SHIPPED | OUTPATIENT
Start: 2019-01-20

## 2019-01-20 RX ADMIN — POTASSIUM CHLORIDE 40 MEQ: 1500 TABLET, EXTENDED RELEASE ORAL at 06:30

## 2019-01-20 RX ADMIN — OXYCODONE HYDROCHLORIDE 20 MG: 10 TABLET ORAL at 11:53

## 2019-01-20 RX ADMIN — POTASSIUM CHLORIDE 40 MEQ: 1500 TABLET, EXTENDED RELEASE ORAL at 11:50

## 2019-01-20 RX ADMIN — BUSPIRONE HYDROCHLORIDE 7.5 MG: 5 TABLET ORAL at 06:29

## 2019-01-20 RX ADMIN — OXYCODONE HYDROCHLORIDE 20 MG: 10 TABLET ORAL at 06:29

## 2019-01-20 RX ADMIN — ACETAMINOPHEN 500 MG: 500 TABLET, FILM COATED ORAL at 06:29

## 2019-01-20 RX ADMIN — DULOXETINE 60 MG: 30 CAPSULE, DELAYED RELEASE ORAL at 06:29

## 2019-01-20 RX ADMIN — OXYCODONE HYDROCHLORIDE 20 MG: 10 TABLET ORAL at 00:49

## 2019-01-20 RX ADMIN — ACETAMINOPHEN 500 MG: 500 TABLET, FILM COATED ORAL at 11:53

## 2019-01-20 ASSESSMENT — PAIN SCALES - GENERAL
PAINLEVEL_OUTOF10: 4
PAINLEVEL_OUTOF10: 0
PAINLEVEL_OUTOF10: 0
PAINLEVEL_OUTOF10: 5

## 2019-01-20 ASSESSMENT — ENCOUNTER SYMPTOMS
FEVER: 0
RESPIRATORY NEGATIVE: 1
CHILLS: 0
EYES NEGATIVE: 1
CARDIOVASCULAR NEGATIVE: 1
VOMITING: 0
GASTROINTESTINAL NEGATIVE: 1
CONSTITUTIONAL NEGATIVE: 1
ABDOMINAL PAIN: 1
NAUSEA: 0

## 2019-01-20 NOTE — DISCHARGE INSTRUCTIONS
Follow up with Dr. Baldwin ([facility], [phone]) in 10-14 days.      Cholecystitis  Cholecystitis is inflammation of the gallbladder. It is often called a gallbladder attack. The gallbladder is a pear-shaped organ that lies beneath the liver on the right side of the body. The gallbladder stores bile, which is a fluid that helps the body to digest fats. If bile builds up in your gallbladder, your gallbladder becomes inflamed. This condition may occur suddenly (be acute). Repeat episodes of acute cholecystitis or prolonged episodes may lead to a long-term (chronic) condition. Cholecystitis is serious and it requires treatment.  What are the causes?  The most common cause of this condition is gallstones. Gallstones can block the tube (duct) that carries bile out of your gallbladder. This causes bile to build up. Other causes of this condition include:  · Damage to the gallbladder due to a decrease in blood flow.  · Infections in the bile ducts.  · Scars or kinks in the bile ducts.  · Tumors in the liver, pancreas, or gallbladder.  What increases the risk?  This condition is more likely to develop in:  · People who have sickle cell disease.  · People who take birth control pills or use estrogen.  · People who have alcoholic liver disease.  · People who have liver cirrhosis.  · People who have their nutrition delivered through a vein (parenteral nutrition).  · People who do not eat or drink (do fasting) for a long period of time.  · People who are obese.  · People who have rapid weight loss.  · People who are pregnant.  · People who have increased triglyceride levels.  · People who have pancreatitis.  What are the signs or symptoms?  Symptoms of this condition include:  · Abdominal pain, especially in the upper right area of the abdomen.  · Abdominal tenderness or bloating.  · Nausea.  · Vomiting.  · Fever.  · Chills.  · Yellowing of the skin and the whites of the eyes (jaundice).  How is this  diagnosed?  This condition is diagnosed with a medical history and physical exam. You may also have other tests, including:  · Imaging tests, such as:  ¨ An ultrasound of the gallbladder.  ¨ A CT scan of the abdomen.  ¨ A gallbladder nuclear scan (HIDA scan). This scan allows your health care provider to see the bile moving from your liver to your gallbladder and to your small intestine.  ¨ MRI.  · Blood tests, such as:  ¨ A complete blood count, because the white blood cell count may be higher than normal.  ¨ Liver function tests, because some levels may be higher than normal with certain types of gallstones.  How is this treated?  Treatment may include:  · Fasting for a certain amount of time.  · IV fluids.  · Medicine to treat pain or vomiting.  · Antibiotic medicine.  · Surgery to remove your gallbladder (cholecystectomy). This may happen immediately or at a later time.  Follow these instructions at home:  Home care will depend on your treatment. In general:  · Take over-the-counter and prescription medicines only as told by your health care provider.  · If you were prescribed an antibiotic medicine, take it as told by your health care provider. Do not stop taking the antibiotic even if you start to feel better.  · Follow instructions from your health care provider about what to eat or drink. When you are allowed to eat, avoid eating or drinking anything that triggers your symptoms.  · Keep all follow-up visits as told by your health care provider. This is important.  Contact a health care provider if:  · Your pain is not controlled with medicine.  · You have a fever.  Get help right away if:  · Your pain moves to another part of your abdomen or to your back.  · You continue to have symptoms or you develop new symptoms even with treatment.  This information is not intended to replace advice given to you by your health care provider. Make sure you discuss any questions you have with your health care  provider.  Document Released: 12/18/2006 Document Revised: 04/27/2017 Document Reviewed: 03/30/2016  [employer] Patient Education © 2017 [employer]  Discharge Instructions    Discharged to home by car with relative. Discharged via wheelchair, hospital escort: Refused.  Special equipment needed: Not Applicable    Be sure to schedule a follow-up appointment with your primary care doctor or any specialists as instructed.     Discharge Plan:   Diet Plan: Discussed  Activity Level: Discussed  Confirmed Follow up Appointment: Patient to Call and Schedule Appointment  Confirmed Symptoms Management: Discussed  Medication Reconciliation Updated: Yes    I understand that a diet low in cholesterol, fat, and sodium is recommended for good health. Unless I have been given specific instructions below for another diet, I accept this instruction as my diet prescription.   Other diet: Regular Diet    Special Instructions: None    · Is patient discharged on Warfarin / Coumadin?   No     Depression / Suicide Risk    As you are discharged from this [facility], it is important to learn how to keep safe from harming yourself.    Recognize the warning signs:  · Abrupt changes in personality, positive or negative- including increase in energy   · Giving away possessions  · Change in eating patterns- significant weight changes-  positive or negative  · Change in sleeping patterns- unable to sleep or sleeping all the time   · Unwillingness or inability to communicate  · Depression  · Unusual sadness, discouragement and loneliness  · Talk of wanting to die  · Neglect of personal appearance   · Rebelliousness- reckless behavior  · Withdrawal from people/activities they love  · Confusion- inability to concentrate     If you or a loved one observes any of these behaviors or has concerns about self-harm, here's what you can do:  · Talk about it- your feelings and reasons for harming yourself  · Remove any means that you  might use to hurt yourself (examples: pills, rope, extension cords, firearm)  · Get professional help from the community (Mental Health, Substance Abuse, psychological counseling)  · Do not be alone:Call your Safe Contact- someone whom you trust who will be there for you.  · Call your local CRISIS HOTLINE 059-6684 or 139-439-5465  · Call your local Children's Mobile Crisis Response Team [place] (904) 949-2632 or www.[employer]  · Call the toll free National Suicide Prevention Hotlines   · National Suicide Prevention Lifeline 791-451-SSYN (4730)  · National Hope Line Network 800-SUICIDE (119-2899)

## 2019-01-20 NOTE — CARE PLAN
Problem: Infection  Goal: Will remain free from infection  Outcome: PROGRESSING AS EXPECTED  No s/s of infection noted. Afebrile throughout shift. Lap sites remain CDI, well approximated with dermabond. Surrounding ecchymosis, but no areas of edema/erythema.    Problem: Bowel/Gastric:  Goal: Normal bowel function is maintained or improved  Outcome: PROGRESSING AS EXPECTED  Abdomen soft but tender to palpation. Bowel sounds hyperactive throughout. Passing small amount of flatus and had one small BM, per patient.   Denies N/V overnight. Tolerating clear liquid diet at this time.     Problem: Pain Management  Goal: Pain level will decrease to patient's comfort goal  Outcome: PROGRESSING AS EXPECTED  Intermittent aching pain reported to abdomen, but in less severity when compared with pain prior to today's ERCP. PRN oxycodone 20mg ordered q3h and able to manage   pain level well at this time. Encouraging patient to utilize nonpharmacologic therapies additionally, such as pillow support, repositioning, splinting, music, and distraction.

## 2019-01-20 NOTE — PROGRESS NOTES
Gastroenterology Progress Note     Author: Lewis Law   Date & Time Created: 1/20/2019 9:41 AM    Chief Complaint:  Abdominal pain    Interval History:  S/p ERCP with therapeutic intervention with symptomatic improvement.     Review of Systems:  Review of Systems   Constitutional: Negative.    HENT: Negative.    Eyes: Negative.    Respiratory: Negative.    Cardiovascular: Negative.    Gastrointestinal: Negative.        Physical Exam:  Physical Exam   Constitutional: She is oriented to person, place, and time. She appears well-developed.   HENT:   Head: Normocephalic.   Neck: Normal range of motion.   Cardiovascular: Normal rate.    Pulmonary/Chest: Effort normal.   Abdominal: Soft. Bowel sounds are normal.   Neurological: She is alert and oriented to person, place, and time.   Skin: Skin is warm and dry.   Psychiatric: She has a normal mood and affect. Her behavior is normal. Judgment and thought content normal.       Labs:          Recent Labs      01/18/19 0428 01/19/19 0351 01/20/19 0424   SODIUM  132*  134*  137   POTASSIUM  2.8*  2.9*  3.8   CHLORIDE  98  97  101   CO2  26  27  29   BUN  <5*  <5*  9   CREATININE  0.57  0.54  0.66   MAGNESIUM   --   1.8   --    CALCIUM  7.7*  8.0*  8.4     Recent Labs      01/18/19 0428 01/19/19 0351 01/20/19 0424   ALTSGPT  223*  186*  182*   ASTSGOT  117*  79*  106*   ALKPHOSPHAT  277*  303*  305*   TBILIRUBIN  3.9*  1.9*  1.5   LIPASE  57   --   30   GLUCOSE  85  99  107*     Recent Labs      01/18/19 0428 01/20/19 0424   RBC  3.52*  3.93*   HEMOGLOBIN  10.7*  11.8*   HEMATOCRIT  31.2*  35.4*   PLATELETCT  194  266     Recent Labs      01/18/19 0428 01/19/19 0351 01/20/19 0424   WBC  5.5   --   5.9   NEUTSPOLYS  80.50*   --   74.50*   LYMPHOCYTES  7.40*   --   14.50*   MONOCYTES  10.20   --   9.70   EOSINOPHILS  1.10   --   0.30   BASOPHILS  0.40   --   0.30   ASTSGOT  117*  79*  106*   ALTSGPT  223*  186*  182*   ALKPHOSPHAT  277*  303*   305*   TBILIRUBIN  3.9*  1.9*  1.5     Hemodynamics:  Temp (24hrs), Av.8 °C (98.3 °F), Min:36.7 °C (98.1 °F), Max:36.9 °C (98.5 °F)  Temperature: 36.9 °C (98.4 °F)  Pulse  Av.4  Min: 60  Max: 121Heart Rate (Monitored): 68  Blood Pressure: 142/81, NIBP: (!) 168/84     Respiratory:    Respiration: 16, Pulse Oximetry: 94 %        RUL Breath Sounds: Clear, RML Breath Sounds: Clear, RLL Breath Sounds: Diminished, CHRISTINA Breath Sounds: Clear, LLL Breath Sounds: Diminished  Fluids:    Intake/Output Summary (Last 24 hours) at 19 0941  Last data filed at 19 0200   Gross per 24 hour   Intake          2393.75 ml   Output              450 ml   Net          1943.75 ml        GI/Nutrition:  Orders Placed This Encounter   Procedures   • Diet Order Regular     Standing Status:   Standing     Number of Occurrences:   1     Order Specific Question:   Diet:     Answer:   Regular [1]     Medical Decision Making, by Problem:  Active Hospital Problems    Diagnosis   • Choledocholithiasis [K80.50]   • Gallstone pancreatitis [K85.10]   • Mood disorder (HCC) [F39]   • Elevated liver enzymes [R74.8]   • Chest pain [R07.9]   • Chronic narcotic dependence (HCC) [F11.20]       Plan:  Choledocholithiasis: ERCP with therapeutic intervention.   Elevated LFTs: Improving  Nutrition: Advance to low fat diet.   Disposition: Patient may be discharged from GI perspective.     Quality-Core Measures

## 2019-01-20 NOTE — PROGRESS NOTES
Hospital Medicine Daily Progress Note    Date of Service  1/19/2019    Chief Complaint  Abdominal pain    Hospital Course    *63-year-old female with history of chronic pain, depression presents with right upper quadrant pain radiating to her chest and between her shoulder blades she had significant LFT elevation however subsequent MRCP did not reveal any obstructing stones.  She was taken to the OR on 1/14 by Dr. Mayen-underwent lap jef, the following morning LFTs unfortunately were worse.*      Interval Problem Update  Spouse at bedside  Patient status post ERCP  Discussed findings and plan of care  She has more pain but actually looks much better    Consultants/Specialty  General surgery  [facility]    Code Status  Full    Disposition  Home    Review of Systems  Review of Systems   Constitutional: Negative for malaise/fatigue.   HENT: Negative for sinus pain and sore throat.    Respiratory: Negative for cough and shortness of breath.    Cardiovascular: Negative for chest pain and palpitations.   Gastrointestinal: Positive for abdominal pain (ongoing, generalized). Negative for nausea and vomiting.   Genitourinary: Negative for frequency and urgency.   Musculoskeletal: Positive for back pain (Chronic).   Skin: Negative for itching and rash.   Neurological: Negative for dizziness, weakness and headaches.   Psychiatric/Behavioral: The patient is not nervous/anxious.         Physical Exam  Temp:  [36.7 °C (98.1 °F)-37.1 °C (98.7 °F)] 36.9 °C (98.5 °F)  Pulse:  [63-89] 66  Resp:  [14-18] 16  BP: (118-168)/(73-95) 145/75  SpO2:  [86 %-100 %] 92 %    Physical Exam   Constitutional: She is oriented to person, place, and time. She appears well-developed and well-nourished. No distress.   HENT:   Head: Normocephalic and atraumatic.   Right Ear: External ear normal.   Left Ear: External ear normal.   Nose: Nose normal.   Mouth/Throat: Oropharynx is clear and moist. No oropharyngeal exudate.   Eyes:  Pupils are equal, round, and reactive to light. Conjunctivae and EOM are normal. Right eye exhibits no discharge. Left eye exhibits no discharge. No scleral icterus.   Neck: Neck supple.   Cardiovascular: Normal rate and regular rhythm.    Pulmonary/Chest: Effort normal and breath sounds normal.   Abdominal: Soft. She exhibits no distension and no mass. There is tenderness (Mild, generalized, more in the epigastrium than other areas). There is no rebound and no guarding.   Decreased bowel sounds   Musculoskeletal: She exhibits no edema.   Neurological: She is alert and oriented to person, place, and time.   Skin: Skin is warm and dry. She is not diaphoretic.   Psychiatric: She has a normal mood and affect.   Nursing note and vitals reviewed.      Fluids    Intake/Output Summary (Last 24 hours) at 01/19/19 2052  Last data filed at 01/19/19 1940   Gross per 24 hour   Intake          2873.75 ml   Output              450 ml   Net          2423.75 ml       Laboratory  Recent Labs      01/17/19   0408  01/18/19   0428   WBC  3.4*  5.5   RBC  3.21*  3.52*   HEMOGLOBIN  9.7*  10.7*   HEMATOCRIT  29.9*  31.2*   MCV  93.1  88.6   MCH  30.2  30.4   MCHC  32.4*  34.3   RDW  43.8  40.3   PLATELETCT  134*  194   MPV  10.4  10.0     Recent Labs      01/17/19   0408  01/18/19   0428  01/19/19   0351   SODIUM  135  132*  134*   POTASSIUM  3.0*  2.8*  2.9*   CHLORIDE  99  98  97   CO2  26  26  27   GLUCOSE  86  85  99   BUN  7*  <5*  <5*   CREATININE  0.61  0.57  0.54   CALCIUM  7.4*  7.7*  8.0*                   Imaging  [id number] - PANC STONE REMOVAL   Final Result      Intraoperative image as above described.      [id number] VIEW   Final Result      No evidence of bowel obstruction.      [id number]   Final Result         1. No choledocholithiasis.   2. Unchanged 10 mm dilatation of the CBD, nonspecific. No pancreatic ductal dilatation.   3. Cholelithiasis. No evidence of cholecystitis.   4. Colonic diverticulosis.       CT-CTA COMPLETE THORACOABDOMINAL AORTA   Final Result      1.  No aortic aneurysm or dissection. No pulmonary embolus.   2.  Colonic diverticulosis.   3.  Mild circumferential mural thickening of the transverse and descending colon, likely related to underdistention or sequela of prior colitis. Acute colitis is less likely.         US-RUQ   Final Result         1. Cholelithiasis. No evidence of cholecystitis.   2. Dilated, 10 mm CBD. If there is clinical concern for choledocholithiasis, consider MRCP.   3. Increased hepatic echogenicity, suggestive of hepatic steatosis or hepatocellular disease.      DX-CHEST-PORTABLE (1 VIEW)   Final Result      No acute cardiopulmonary abnormality.      DX-PORTABLE FLUOROSCOPY < 1 HOUR Is the patient pregnant? No    (Results Pending)        Assessment/Plan  Choledocholithiasis   Assessment & Plan    Status post successful ERCP  Advance to low-fat diet as tolerated-discussed with patient, spouse  Discussed with GI  A.m. labs ordered by GI     Gallstone pancreatitis   Assessment & Plan    And lipase ordered     Mood disorder (HCC)   Assessment & Plan    With significant anxiety  Currently stable on meds     Elevated liver enzymes   Assessment & Plan    Due to ductal stones and obstruction     Chronic narcotic dependence (HCC)   Assessment & Plan    More pain, oxycodone increased     Chest pain   Assessment & Plan    -Actual right upper quadrant pain, no ACS          VTE prophylaxis: Lovenox (held for procedure this morning)

## 2019-01-20 NOTE — PROGRESS NOTES
Surgical Progress Note    Author: Rich Whitt Date & Time created: 2019   9:11 AM     Interval Events:  Feels much better today.  Denies N/V.  Passing flatus.  Tolerating CLD.  Underwent ERCP yesterday    Review of Systems   Constitutional: Negative for chills and fever.   Gastrointestinal: Positive for abdominal pain. Negative for nausea and vomiting.     Hemodynamics:  Temp (24hrs), Av.8 °C (98.3 °F), Min:36.7 °C (98.1 °F), Max:36.9 °C (98.5 °F)  Temperature: 36.9 °C (98.4 °F)  Pulse  Av.4  Min: 60  Max: 121Heart Rate (Monitored): 68  Blood Pressure: 142/81, NIBP: (!) 168/84     Respiratory:    Respiration: 16, Pulse Oximetry: 94 %        RUL Breath Sounds: Clear, RML Breath Sounds: Clear, RLL Breath Sounds: Diminished, CHRISTINA Breath Sounds: Clear, LLL Breath Sounds: Diminished  Neuro:  GCS = 15       Fluids:    Intake/Output Summary (Last 24 hours) at 19 0911  Last data filed at 19 0200   Gross per 24 hour   Intake          2393.75 ml   Output              450 ml   Net          1943.75 ml        Current Diet Order   Procedures   • Diet Order Clear Liquids - No Red Foods     Physical Exam   Constitutional: She is oriented to person, place, and time. She appears well-developed and well-nourished. No distress.   Eyes: Pupils are equal, round, and reactive to light. No scleral icterus.   Cardiovascular: Normal rate, regular rhythm and normal heart sounds.    Pulmonary/Chest: Effort normal and breath sounds normal.   Abdominal: Soft. Bowel sounds are normal. She exhibits no distension. There is tenderness (Minimal, at laproscopic incisions). There is no rebound and no guarding.   Neurological: She is alert and oriented to person, place, and time.   Skin: Skin is warm and dry.   Psychiatric: She has a normal mood and affect. Her behavior is normal.     Labs:  Recent Results (from the past 24 hour(s))   COMP METABOLIC PANEL    Collection Time: 19  4:24 AM   Result Value Ref Range     Sodium 137 135 - 145 mmol/L    Potassium 3.8 3.6 - 5.5 mmol/L    Chloride 101 96 - 112 mmol/L    Co2 29 20 - 33 mmol/L    Anion Gap 7.0 0.0 - 11.9    Glucose 107 (H) 65 - 99 mg/dL    Bun 9 8 - 22 mg/dL    Creatinine 0.66 0.50 - 1.40 mg/dL    Calcium 8.4 8.4 - 10.2 mg/dL    AST(SGOT) 106 (H) 12 - 45 U/L    ALT(SGPT) 182 (H) 2 - 50 U/L    Alkaline Phosphatase 305 (H) 30 - 99 U/L    Total Bilirubin 1.5 0.1 - 1.5 mg/dL    Albumin 2.3 (L) 3.2 - 4.9 g/dL    Total Protein 5.2 (L) 6.0 - 8.2 g/dL    Globulin 2.9 1.9 - 3.5 g/dL    A-G Ratio 0.8 g/dL   CBC WITH DIFFERENTIAL    Collection Time: 01/20/19  4:24 AM   Result Value Ref Range    WBC 5.9 4.8 - 10.8 K/uL    RBC 3.93 (L) 4.20 - 5.40 M/uL    Hemoglobin 11.8 (L) 12.0 - 16.0 g/dL    Hematocrit 35.4 (L) 37.0 - 47.0 %    MCV 90.1 81.4 - 97.8 fL    MCH 30.0 27.0 - 33.0 pg    MCHC 33.3 (L) 33.6 - 35.0 g/dL    RDW 41.4 35.9 - 50.0 fL    Platelet Count 266 164 - 446 K/uL    MPV 9.8 9.0 - 12.9 fL    Neutrophils-Polys 74.50 (H) 44.00 - 72.00 %    Lymphocytes 14.50 (L) 22.00 - 41.00 %    Monocytes 9.70 0.00 - 13.40 %    Eosinophils 0.30 0.00 - 6.90 %    Basophils 0.30 0.00 - 1.80 %    Immature Granulocytes 0.70 0.00 - 0.90 %    Nucleated RBC 0.00 /100 WBC    Neutrophils (Absolute) 4.37 2.00 - 7.15 K/uL    Lymphs (Absolute) 0.85 (L) 1.00 - 4.80 K/uL    Monos (Absolute) 0.57 0.00 - 0.85 K/uL    Eos (Absolute) 0.02 0.00 - 0.51 K/uL    Baso (Absolute) 0.02 0.00 - 0.12 K/uL    Immature Granulocytes (abs) 0.04 0.00 - 0.11 K/uL    NRBC (Absolute) 0.00 K/uL   LIPASE    Collection Time: 01/20/19  4:24 AM   Result Value Ref Range    Lipase 30 7 - 58 U/L   ESTIMATED GFR    Collection Time: 01/20/19  4:24 AM   Result Value Ref Range    GFR If African American >60 >60 mL/min/1.73 m 2    GFR If Non African American >60 >60 mL/min/1.73 m 2     Medical Decision Making, by Problem:  Active Hospital Problems    Diagnosis   • Choledocholithiasis [K80.50]     Priority: High   • Gallstone pancreatitis  [K85.10]   • Mood disorder (HCC) [F39]   • Elevated liver enzymes [R74.8]   • Chest pain [R07.9]   • Chronic narcotic dependence (HCC) [F11.20]     Plan:  Start regular diet today.  Follow up with Dr. Baldwin written for in orders    Quality Measures:  Quality-Core Measures   Reviewed items::  Labs reviewed and Medications reviewed  Chritsine catheter::  No Christine  DVT prophylaxis pharmacological::  Enoxaparin (Lovenox)      Discussed patient condition with Hospitalist, patient

## 2019-01-21 NOTE — DISCHARGE SUMMARY
Discharge Summary    CHIEF COMPLAINT ON ADMISSION  Chief Complaint   Patient presents with   • Chest Pain     pain started at 0400, radiating to back. Patient report 9/10 pain, denies cardiac history, EKG completed.    • Back Pain       Reason for Admission  Chest Pains, Back Pain     Admission Date  1/13/2019    CODE STATUS  Prior    HPI & HOSPITAL COURSE    *63-year-old female with history of chronic pain, depression presents with right upper quadrant pain radiating to her chest and between her shoulder blades she had significant LFT elevation however subsequent MRCP did not reveal any obstructing stones.  She was taken to the OR on 1/14 by Dr. Mayen-underwent lap jef, the following morning LFTs unfortunately were worse.*      The following day LFTs continue to be elevated and  and lipase was also elevated up further, she had increased epigastric tenderness consistent with pancreatitis.  Diet was decreased to clear liquids and she was started on IV fluids and increased pain medication.    The following day she continued to have epigastric and generalized abdominal pain lipase had improved but LFTs were increased further particularly bilirubin.     The following day there was not significant improvement and GI was consulted, she went underwent ERCP which was successful and stones were obtained.  Per Dr. Law, MRCP has a 20% false negative and this is the reason she had choledocholithiasis which was not recognized prior to her cholecystectomy.  Following ERCP her pain had improved significantly she had mild residual tenderness and she tolerated diet without difficulty.  She was anxious for discharge.  We discussed diet restrictions    Therefore, she is discharged in good and stable condition to home with close outpatient follow-up.    The patient met 2-midnight criteria for an inpatient stay at the time of discharge.    Discharge Date  1/20/2019    FOLLOW UP ITEMS POST DISCHARGE  Surgery  Primary  care  Pain management    DISCHARGE DIAGNOSES  Active Problems:    Choledocholithiasis POA: Unknown    Chest pain POA: Unknown    Chronic narcotic dependence (HCC) POA: Unknown    Elevated liver enzymes POA: Unknown    Mood disorder (HCC) POA: Unknown    Gallstone pancreatitis POA: Unknown  Resolved Problems:    * No resolved hospital problems. *      FOLLOW UP  No future appointments.  Doris Mayen M.D.  [address] [place] [address] [id number] [phone]    In 2 weeks      Micheline Walker D.O.  [address] [facility] [address]  [phone]    Schedule an appointment as soon as possible for a visit in 1 week  Hospital follow-up appointment with PCP      MEDICATIONS ON DISCHARGE     Medication List      START taking these medications      Instructions   potassium chloride SA 20 MEQ Tbcr  Commonly known as:  Kdur   Take 1 Tab by mouth 3 times a day.  Dose:  20 mEq        CONTINUE taking these medications      Instructions   busPIRone 7.5 MG tablet  Commonly known as:  BUSPAR   Take 7.5 mg by mouth 2 times a day.  Dose:  7.5 mg     cyclobenzaprine 10 MG Tabs  Commonly known as:  FLEXERIL   Take 10 mg by mouth 3 times a day as needed for Muscle Spasms.  Dose:  10 mg     DULoxetine 60 MG Cpep delayed-release capsule  Commonly known as:  CYMBALTA   Take 60 mg by mouth 2 times a day.  Dose:  60 mg     ibuprofen 200 MG Tabs  Commonly known as:  MOTRIN   Take 400 mg by mouth every 6 hours as needed for Mild Pain.  Dose:  400 mg     oxyCODONE-acetaminophen  MG Tabs  Commonly known as:  PERCOCET-10   Take 0.5-1 Tabs by mouth every 6 hours as needed for Severe Pain.  Dose:  0.5-1 Tab            Allergies  No Known Allergies    DIET  Low-fat, easy to digest foods    ACTIVITY  As tolerated    CONSULTATIONS  [facility]-Doris Mayen MD  [facility]    PROCEDURES   3:25 PM         []Hide copied text  []Hover for attribution information  DATE OF OPERATION: January 14,  2019     PREOPERATIVE DIAGNOSIS: Cholelithiasis with transient obstruction, chronic pain  POSTOPERATIVE DIAGNOSIS: Cholelithiasis with transient obstruction, chronic pain     PROCEDURE: Laparoscopic cholecystectomy     SURGEON: Doris Baldwin MD   ASSISTANT:  Aurora Grewal MS3     ANESTHESIOLOGIST: Dr. Betancourt with [facility]     SPECIMENS: Gallbladder and contents.      FINDINGS: Edematous gallbladder without adhesions     INDICATIONS: Ms. Tsang is a healthy 63 year old woman who presented now with two episodes of epigastric pain likely secondary to transient obstruction of the common bile duct from cholelithiasis. We discussed definitive surgical therapy with risks including, but   not limited to bleeding, infection, damage to the common duct, possibly   requiring hepaticojejunostomy. Patient understood and agreed to proceed.      DESCRIPTION OF PROCEDURE: The patient was brought to the operating room. She   was placed in a comfortable supine position on the operating room table with   all appropriate points padded. General anesthesia was induced without   complication. A time-out was held and completed confirming correct patient,   correct site, correct procedure and SCDs on and functioning. She received   Ancef prior to incision. After prepping the abdomen with ChloraPrep and   draping in usual sterile fashion, 0.25% Marcaine with epinephrine was   infiltrated supraumbilically and a 1-cm incision was made in this area. This   was taken down the fascia bluntly using a hemostat and a penetrating towel   clip was used to grasp the umbilical stalk and elevate the abdominal wall. A   Veress needle was placed into the abdomen and a saline drop test showed no   evidence of injury to bowel or vessel. I then insufflated the abdomen to a   pressure of 15 mmHg with CO2. An 11-mm trocar was placed through this   incision into the abdomen and a camera was then inserted confirming no   evidence of injury to bowel or  vessel.  I then placed an 11-mm   trocar in the epigastrium and two 5-mm trocars in the right upper quadrant   under direct visualization after appropriate numbing of the skin. My assistant grasped   the fundus of the gallbladder lifted up over the liver edge. The gallbladder was moderately tense, and retracting it put a hole in the wall. Clear green bile spilled, but no stones. I then grasped the   infundibulum of the gallbladder and   retracted it laterally and circumferentially dissected the cystic duct and the   cystic artery from lateral to medial using a Maryland grasper, obtaining the critical view of safety. I then placed   three 10-mm clips on the patient's side and one on the specimen side on the   cystic duct and divided sharply. I then placed 2 clips on the cystic artery   on the patient's side and one on the specimen side and divided sharply and   removed the gallbladder from the liver bed using Bovie electrocautery.The gallbladder was then placed in an EndoCatch   bag and removed through the epigastric port incision. I then obtained   excellent hemostasis and copiously irrigated the area with 2 liters of warm   saline until entirely clear. I closed the fascia on the epigastric port using   a single interrupted 0 Vicryl with an Endoclose under direct visualization   and then removed the two 5-mm ports confirming good hemostasis. I closed the   fascia on the umbilical port using a single interrupted 0 Vicryl suture and   irrigated all 4 incisions. I then closed the skin using a single running 4-0   Vicryl in a subcuticular fashion. These were then dressed with dry dressings   and patient was awoken from anesthesia in good condition having tolerated the   procedure well. All counts were correct at the end of the case x2.         DATE OF SERVICE:  01/19/2019     PROCEDURE PERFORMED:  Endoscopic retrograde cholangiography with   sphincterotomy and removal of small stone.     PHYSICIAN:  Lewis Law,  MD     MEDICATIONS:  General anesthesia.     Anesthesiologist present.     CONSENT:  Procedure risks and benefits reviewed thoroughly with the patient,   risks including but not limited to bleeding, perforation, side effects of   medication were informed.  Patient voiced understanding and agreed to proceed.    Additional risks inherent to ERCP that being mild, moderate, severe   pancreatitis that could lead to postprocedural pain, prolonged   hospitalization, intensive care unit stay, and/or death were reviewed with the   patient, who voiced understanding and agreed to proceed.     DESCRIPTION OF PROCEDURE:  Patient was placed in a prone position after   intubation and sedation.  A bite block was inserted in the mouth and a   side-viewing duodenoscope was passed carefully and easily under indirect   visualization into the esophagus, past the second portion of duodenum brought   in a shortened position.  The ampulla was adjacent in the middle of 2 large   mouth duodenal diverticulum.  A CleverCut sphincterotome with a 0.035 wire was   utilized to cannulate the ampulla.  Initial pancreatic duct cannulation was   appreciated.  This was only done once.  After which the tip of the tome was   manipulated with bowing and then the wire was advanced in the trajectory of   the bile duct, which was confirmed by aspiration of bile and confirmation by   cholangiogram, which revealed a filling defect in the distal common bile duct   and markedly dilated common bile duct.  After which the tome was retracted.    Sphincterotomy was performed to approximately 12 mm.  After which the wire was   maintained and the tome was exchanged for a 9-12 mm balloon and the balloon   was utilized to dredge the system.  Because of the marked dilation of the   distal common bile duct, the balloon was switched to 15 mm balloon.  The   entire common bile duct was diffusely dilated to approximately 15 mm.  There   was no associated choledochal cyst.   This may be a type 1 choledochocele.    After which dredging with the 15 mm balloon was allowed for removal of the   balloon through the cut ampulla with removal of small sludge.  After which all   the contrast that had been injected into the intra and extrahepatic system   was spontaneously exited the ampulla without any obstruction to flow.    Intrahepatics were not dilated.  The stomach was suctioned, desufflated, and   the scope was removed after confirmation of greater than 50% of the contrast   injected, spontaneously exiting the ampulla within a matter of less than 1   minute.  The stomach was suctioned, desufflated, the scope was removed.     COMPLICATIONS:  None.     BLOOD LOSS:  None.     SPECIMENS:  None.     RECOMMENDATIONS:  Successful sphincterotomy, removal of sludge and   decompression of biliary system.  The cholangiogram reveals likely type 1   choledochocele.  Patient no longer has any obstruction to flow and liver tests   will likely improve now.  Because we wired the pancreatic duct on a single   occasion, I did place 100 mg of indomethacin per rectum to decrease risk for   ERCP-induced pancreatitis.  We will follow patient clinically and further   treatment recommendations to follow.  Patient may be started on a clear liquid   diet, advanced to low fat diet as tolerated.  Please follow liver tests to   ensure normalization.  The above findings and recommendations will be reviewed   with the patient.  All questions will be answered to her satisfaction upon   recovery.     Please note that all fluoroscopic images were interpreted and performed by me.        ____________________________________     Lewis Law MD       LABORATORY  Lab Results   Component Value Date    SODIUM 137 01/20/2019    POTASSIUM 3.8 01/20/2019    CHLORIDE 101 01/20/2019    CO2 29 01/20/2019    GLUCOSE 107 (H) 01/20/2019    BUN 9 01/20/2019    CREATININE 0.66 01/20/2019        Lab Results   Component Value Date    WBC  5.9 01/20/2019    HEMOGLOBIN 11.8 (L) 01/20/2019    HEMATOCRIT 35.4 (L) 01/20/2019    PLATELETCT 266 01/20/2019        Total time of the discharge process exceeds 35 minutes.

## 2022-03-28 NOTE — PROGRESS NOTES
Bedside report received from CHIP Forrest. Patient resting comfortably in bed. Plan of care discussed and questions answered. No needs verbalized at this time. Standard falls precautions in place, call light within reach.     From: Lynne Santillan  To: Melecio Esposito PA-C  Sent: 3/27/2022 5:03 PM CDT  Subject: Immunization     Hi Ritu Cummings    We are planning a trip in early May to [facility] and [facility] and would like to know if I can get a Malaria prescription and can you administer the Typhoid and Hepatitis A in your office. Please advise. Thank you.   Alexia Ventura  [phone]

## (undated) DEVICE — CANISTER SUCTION RIGID RED 1500CC (40EA/CA)

## (undated) DEVICE — NEPTUNE 4 PORT MANIFOLD - (20/PK)

## (undated) DEVICE — TUBE CONNECTING SUCTION - CLEAR PLASTIC STERILE 72 IN (50EA/CA)

## (undated) DEVICE — ELECTRODE DUAL RETURN W/ CORD - (50/PK)

## (undated) DEVICE — GUIDE TRACHE TUBE INTUBATING STYLET 5.0-10.0MM 14FR (20EA/PK)

## (undated) DEVICE — ANESTHESIA CIRCUIT BAIN PED

## (undated) DEVICE — SUTURE 0 VICRYL PLUS CT-2 - 27 INCH (36/BX)

## (undated) DEVICE — CLIP MED LG INTNL HRZN TI ESCP - (20/BX)

## (undated) DEVICE — KIT ROOM DECONTAMINATION

## (undated) DEVICE — SYRINGE DISP. 50CC LS - (40/BX)

## (undated) DEVICE — GUIDE JAGWIRE 035 STRAIGHT (2EA/BX)

## (undated) DEVICE — TROCAR 5X100 NON BLADED Z-TH - READ KII (6/BX)

## (undated) DEVICE — TUBE E-T HI-LO CUFF 7.0MM (10EA/PK)

## (undated) DEVICE — SET SUCTION/IRRIGATION WITH DISPOSABLE TIP (6/CA )PART #0250-070-520 IS A SUB

## (undated) DEVICE — SUTURE 0 COATED VICRYL 6-18IN - (12PK/BX)

## (undated) DEVICE — WATER IRRIGATION STERILE 1000ML (12EA/CA)

## (undated) DEVICE — SUTURE 0 VICRYL PLUS UR-6 - 27 INCH (36/BX)

## (undated) DEVICE — BAG, SPONGE COUNT 50600

## (undated) DEVICE — DRESSING TRANSPARENT FILM TEGADERM 2.375 X 2.75"  (100EA/BX)"

## (undated) DEVICE — TUBING INSUFFLATION - (10/BX)

## (undated) DEVICE — ELECTRODE 850 FOAM ADHESIVE - HYDROGEL RADIOTRNSPRNT (50/PK)

## (undated) DEVICE — EXTRACTOR PRO XL 9-12 MM ABOVE

## (undated) DEVICE — SUTURE GENERAL

## (undated) DEVICE — MASK ANESTHESIA ADULT  - (100/CA)

## (undated) DEVICE — GLOVE SZ 7.5 LF PROTEXIS (50PR/BX)

## (undated) DEVICE — TROCAR Z THREAD11MM OPTICAL - NON BLADED(6/BX)

## (undated) DEVICE — CHLORAPREP 26 ML APPLICATOR - ORANGE TINT(25/CA)

## (undated) DEVICE — SODIUM CHL IRRIGATION 0.9% 1000ML (12EA/CA)

## (undated) DEVICE — GOWN SURGEONS X-LARGE - DISP. (30/CA)

## (undated) DEVICE — SPONGE GAUZESTER. 2X2 4-PL - (2/PK 50PK/BX 30BX/CS)

## (undated) DEVICE — SENSOR SPO2 NEO LNCS ADHESIVE (20/BX) SEE USER NOTES

## (undated) DEVICE — SPHINCTEROTOME CLEVER CUT

## (undated) DEVICE — Device

## (undated) DEVICE — ELECTRODE 5MM LHK LAPSCP STERILE DISP- MEGADYNE  (5/CA)

## (undated) DEVICE — GLOVE BIOGEL INDICATOR SZ 7SURGICAL PF LTX - (50/BX 4BX/CA)

## (undated) DEVICE — SENSOR SPO2 ADULT LNCS ADTX (20/BX) ORDER ITEM #19593

## (undated) DEVICE — CANNULA W/SEAL11X100ZTHREAD - (12/BX)

## (undated) DEVICE — PROTECTOR ULNA NERVE - (36PR/CA)

## (undated) DEVICE — GLOVE, BIOGEL ECLIPSE, SZ 7.0, PF LTX (50/BX)

## (undated) DEVICE — GOWN WARMING STANDARD FLEX - (30/CA)

## (undated) DEVICE — SYRINGE 30 ML LL (56/BX)

## (undated) DEVICE — BAG RETRIEVAL 10ML (10EA/BX)

## (undated) DEVICE — LACTATED RINGERS INJ 1000 ML - (14EA/CA 60CA/PF)

## (undated) DEVICE — SUCTION INSTRUMENT YANKAUER BULBOUS TIP W/O VENT (50EA/CA)

## (undated) DEVICE — GLOVE, LITE (PAIR)

## (undated) DEVICE — HUMID-VENT HEAT AND MOISTURE EXCHANGE- (50/BX)

## (undated) DEVICE — CANNULA W/SEAL 5X100 Z-THRE - ADED KII (12/BX)

## (undated) DEVICE — HEAD HOLDER JUNIOR/ADULT

## (undated) DEVICE — GLOVE BIOGEL ECLIPSE PF LATEX SIZE 8.5 (50PR/BX)

## (undated) DEVICE — GLOVE BIOGEL SZ 6.5 SURGICAL PF LTX (50PR/BX 4BX/CA)

## (undated) DEVICE — KIT ANESTHESIA W/CIRCUIT & 3/LT BAG W/FILTER (20EA/CA)

## (undated) DEVICE — SCISSORS 5MM CVD (6EA/BX)

## (undated) DEVICE — SUTURE 4-0 VICRYL PLUS FS-2 - 27 INCH (36/BX)

## (undated) DEVICE — NEEDLE INSFL 120MM 14GA VRRS - (20/BX)